# Patient Record
Sex: FEMALE | Race: BLACK OR AFRICAN AMERICAN | Employment: OTHER | URBAN - METROPOLITAN AREA
[De-identification: names, ages, dates, MRNs, and addresses within clinical notes are randomized per-mention and may not be internally consistent; named-entity substitution may affect disease eponyms.]

---

## 2017-02-22 ENCOUNTER — APPOINTMENT (OUTPATIENT)
Dept: GENERAL RADIOLOGY | Age: 67
End: 2017-02-22
Attending: NURSE PRACTITIONER
Payer: MEDICARE

## 2017-02-22 ENCOUNTER — HOSPITAL ENCOUNTER (EMERGENCY)
Age: 67
Discharge: HOME | End: 2017-02-23
Attending: EMERGENCY MEDICINE
Payer: MEDICARE

## 2017-02-22 ENCOUNTER — HOSPITAL ENCOUNTER (OUTPATIENT)
Dept: PHYSICAL THERAPY | Age: 67
Discharge: HOME | End: 2017-02-22
Payer: MEDICARE

## 2017-02-22 VITALS
TEMPERATURE: 98.7 F | BODY MASS INDEX: 40.12 KG/M2 | HEART RATE: 72 BPM | DIASTOLIC BLOOD PRESSURE: 75 MMHG | SYSTOLIC BLOOD PRESSURE: 152 MMHG | OXYGEN SATURATION: 100 % | HEIGHT: 64 IN | RESPIRATION RATE: 24 BRPM | WEIGHT: 235 LBS

## 2017-02-22 DIAGNOSIS — R03.0 ELEVATED BLOOD PRESSURE READING: ICD-10-CM

## 2017-02-22 DIAGNOSIS — R00.2 PALPITATIONS: Primary | ICD-10-CM

## 2017-02-22 DIAGNOSIS — F41.9 ANXIETY DISORDER, UNSPECIFIED: ICD-10-CM

## 2017-02-22 LAB — TROPONIN I BLD-MCNC: <0.04 NG/ML (ref 0–0.08)

## 2017-02-22 PROCEDURE — 97110 THERAPEUTIC EXERCISES: CPT

## 2017-02-22 PROCEDURE — 97530 THERAPEUTIC ACTIVITIES: CPT

## 2017-02-22 PROCEDURE — 93005 ELECTROCARDIOGRAM TRACING: CPT

## 2017-02-22 PROCEDURE — 97162 PT EVAL MOD COMPLEX 30 MIN: CPT

## 2017-02-22 PROCEDURE — 99285 EMERGENCY DEPT VISIT HI MDM: CPT

## 2017-02-22 PROCEDURE — 84484 ASSAY OF TROPONIN QUANT: CPT

## 2017-02-22 PROCEDURE — 71010 XR CHEST PORT: CPT

## 2017-02-22 PROCEDURE — G8981 BODY POS CURRENT STATUS: HCPCS

## 2017-02-22 PROCEDURE — G8982 BODY POS GOAL STATUS: HCPCS

## 2017-02-22 NOTE — PROGRESS NOTES
[address]  [facility] PHYSICAL THERAPY  [address] [facility] [address], [address] - Phone: [phone]  Fax: [phone] / [address] [facility]  Patient Name: Luis Ramirez : 1950   Medical   Diagnosis: Low back pain [M54.5] Treatment Diagnosis: LBP, SIJ dysfunction   Onset Date: ~1 year ago     Referral Source: Ivar Fothergill, DO Start of Care [facility]): 2017   Prior Hospitalization: See medical history Provider #: [id number]   Prior Level of Function: Working part time as  for school program   Comorbidities: previous noncompliance with therapy; chronicity of condition; borderline DM, Hx breast CA with double mastectomy, allergies, anxiety, borderline depression, arthritis, back pain, obese, HX previous surgeries, bladder problems (bladder mesh), cataracts   Medications: Verified on Patient Summary List   The Plan of Care and following information is based on the information from the initial evaluation.   ===========================================================================================  Assessment / key information:  Luis Ramirez is a 73 y.o.  female with Dx: Low back pain [M54.5], signs and symptoms consistent with LBP and SIJ dysfunction. Patient reports LBP began ~1 year ago, previously treated with PT in Aug 2016, however patient was discharged due to noncompliance. Patient reports 3 weeks ago she went bowling and pain has gotten progressively worse since. Patient reports she has radicular pain down L>R LE's, also c/o L>R knee pain. Objective: Patient demonstrates impaired gait and posture with L rib hump and elevated R iliac crest, and slumped sitting posture. Patient with TTP L>R PSIS, as well as increased muscle tone B piriformis. Patient also with impaired lumbar ROM and LE strength, reports pain with all ROM.  Patient with fair+ supine bridge, - SLR and 90/90 HS tests, + Ely test bilaterally. Patient reports centralization of symptoms with extension bias, also + supine to sit test for R anterior SIJ. L/S ROM Range Effect Strength (MMT) Right Left   Flex 75%   Psoas (L2,3) 4+ 4+   Ext 75%   Quads (L3) 4+ 4+   R-lat flex 75%   Ant tib(L4) 4+ 4+   L-lat flex 75%   Hip Ext (S1, S2) 3- 3-   R-rot 50%   Glut Med(L5) 4 4   L-rot 50%   Hamstrings(S1,S2) 4+ 4+      FOTO score 49 points indicating 51% limitation in functional ability. Patient would benefit from skilled PT to address the below listed impairments. Thank you for your referral.  When patient called back for evaluation, she stated \"I should have continued coming last time I was here but I didn't feel like it. \"  ===========================================================================================  Eval Complexity: History: HIGH Complexity :3+ comorbidities / personal factors will impact the outcome/ POC Exam:HIGH Complexity : 4+ Standardized tests and measures addressing body structure, function, activity limitation and / or participation in recreation  Presentation: MEDIUM Complexity : Evolving with changing characteristics  Clinical Decision Making:MEDIUM Complexity : FOTO score of 26-74Overall Complexity:MEDIUM  Problem List: pain affecting function, decrease ROM, decrease strength, edema affecting function, impaired gait/ balance, decrease ADL/ functional abilitiies, decrease activity tolerance, decrease flexibility/ joint mobility and decrease transfer abilities   Treatment Plan may include any combination of the following: Therapeutic exercise, Therapeutic activities, Neuromuscular re-education, Physical agent/modality, Gait/balance training, Manual therapy, Aquatic therapy, Patient education, Self Care training, Functional mobility training, Home safety training and Stair training  Patient / Family readiness to learn indicated by: asking questions, trying to perform skills and interest  Persons(s) to be included in education: patient (P)  Barriers to Learning/Limitations: None  Measures taken: na   Patient Goal (s): \"relief\"   Patient self reported health status: good  Rehabilitation Potential: fair   Short Term Goals: To be accomplished in  2-3  weeks:  1. Patient will demonstrate compliance with HEP for symptom management at home. 2. Patient will demonstrate symmetrical innominates with supine to sit test to decrease stress on spine. 3. Patient will I centralize symptoms to level of buttocks to improve ADL tolerance.  Long Term Goals: To be accomplished in  4-6  weeks:  1. Patient will be independent with HEP to self-manage and prevent symptoms upon DC. 2.  Patient will improve FOTO score to 59 points to indicate improved functional status. 3.  Patient will I centralize symptoms to level of spine to improve ADL tolerance. 4.  Patient will improve B hip ext strength to 4/5 to improve dynamic pelvic stability. Frequency / Duration:   Patient to be seen  2-3  times per week for 4-6  weeks:  Patient / Caregiver education and instruction: self care, activity modification and exercises  G-Codes (GP): [id number] Current  CK= 40-59%   Goal  CK= 40-59%. The severity rating is based on the FOTO Score    Therapist Signature: April JUANA Sheppard Date: 3/82/2747   Certification Period: 2/22/17-5/21/17 Time: 9:15 AM   ===========================================================================================  I certify that the above Physical Therapy Services are being furnished while the patient is under my care. I agree with the treatment plan and certify that this therapy is necessary. Physician Signature:        Date:       Time:     Please sign and return to [employer] or you may fax the signed copy to [phone]. Thank you.

## 2017-02-22 NOTE — PROGRESS NOTES
PHYSICAL THERAPY - DAILY TREATMENT NOTE    Patient Name: Erum Prieto        Date: 2017  : 1950   YES Patient  Verified  Visit #:     Insurance: Payor: [place] MED[place] / Plan: [place] MEDICARE PART A & B / Product Type: Medicare /      In time: 9:13 (late) Out time: 9:55   Total Treatment Time: 43     Medicare Time Tracking (below)   Total Timed Codes (min):  42 1:1 Treatment Time:  42     TREATMENT AREA =  L/S  SUBJECTIVE    Pain Level (on 0 to 10 scale):  7  / 10   Medication Changes/New allergies or changes in medical history, any new surgeries or procedures? YES    If yes, update Summary List   Subjective Functional Status/Changes:  []  No changes reported   When patient called from waiting room, patient states \"I should have continued coming last time I was here but I didn't feel like it. \"    History of Condition: Patient reports LBP and leg pain began ~1 year ago. Patient reports she has had recent MRI, \"something in my back, a bone or something, it wasn't major\". Patient denies injections, patient seen for PT beginning in Aug 2016, however noncompliant DC. Patient reports she went bowling ~3 weeks ago and has had progressively worsening pain. Patient reports she had gone to [place] prior to [place] and was referred to pain specialist. Patient reports at that time she was doing TM and bike at gym. Patient reports she has also been traveling a lot. Patient reports she has no numbness and tingling, however also c/o knee pain L>R.     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; enjoys walking    Pt Goals: \"relief\"     FOTO: 49     LOW BACK EVALUATION    Objective:      Gait:increased trunk flexion, slow vish    Posture: L rib hump, R iliac crest higher; slumped sitting posture    Palpation/Sensation: sensation to light touch intact  L>R PSIS, increased muscle tone B piriformis    (N - normal; R - reduced; MR - markedly reduced)       L/S ROM      Range   Effect  Strength (MMT)          Right        Left    Flex 75%  Psoas (L2,3) 4+ 4+   Ext 75%  Quads (L3) 4+ 4+   R-lat flex 75%  Ant tib(L4) 4+ 4+   L-lat flex 75%  Hip Ext (S1, S2) 3- 3-   R-rot 50%  Glut Med(L5) 4 4   L-rot 50%  Hamstrings(S1,S2) 4+ 4+      Hip IR/ER     Pain will all ROM, none worse than others    Strength (MMT)       Right     Left          Core: Sup Bridge Fair+ Fair+   Core: Side Bridge     Core: Prone plank       Special Tests                       Right     Left          Flexibility         Right              Left    Slump   90/90 HS -11 -19   SLR - - Ely + +      David     Sl screen 3/5=70% LR   Debora     Gaenslen test   Hip IR (prone)     Khari/YAIR sign   Hip ER (prone)     Thigh thrust        Distraction        Lateral Compression        Sacral Provocation          Effect of:  DKC    Supine Bridge    SL Supine Bridge    Prone on Elbows    RFIS    REIL centralized         SI Symmetry:    Standing        Supine            Prone                Dynamic      +/- R/L  ASIS    Fwd Flex    IC    Stork    PSIS    Seated FF      Sup to Sit: + R anterior SIJ    Modalities Rationale:        min [] Estim, type/location:                                      []  att     []  unatt     []  w/US     []  w/ice    []  w/heat    min []  Mechanical Traction: type/lbs                   []  pro   []  sup   []  int   []  cont    []  before manual    []  after manual    min []  Ultrasound, settings/location:      min []  Iontophoresis w/ dexamethasone, location:                                               []  take home patch       []  in clinic    min []  Ice     []  Heat    location/position:     min []  Vasopneumatic Device, press/temp:     min []  Other:    [] Skin assessment post-treatment (if applicable):    []  intact    []  redness- no adverse reaction     []redness  adverse reaction:      15 min Therapeutic Exercise:  [x]  See flow sheet   Rationale:      increase ROM, increase strength and improve coordination to improve the patients ability to perform ADL's and amb with decreased pain and improved ease     15 min Therapeutic Activity: FOTO   Rationale:   Determine functional limitations     min Patient Education:  []  Review HEP   []  Progressed/Changed HEP based on:   HEP initiated      Other Objective/Functional Measures:    See above information     Post Treatment Pain Level (on 0 to 10) scale:   6  / 10     ASSESSMENT    Assessment/Changes in Function:     Justification for Eval Code Complexity:  Patient History (low 0, mod 1-2, high 3-4): previous noncompliance with therapy; chronicity of condition; borderline DM, Hx breast CA with double mastectomy, allergies, anxiety, borderline depression, arthritis, back pain, obese, HX previous surgeries, bladder problems (bladder mesh), cataracts  Examination (low 1-2, mod 3+, high 4+):   Clinical Presentation (low stable or uncomplicated, mod evolving or changing, high unstable or unpredictable):   Clinical Decision Making (low , mod 26-74, high 1-25): FOTO     See PoC     []  See Progress Note/Recertification   Patient will continue to benefit from skilled PT services to modify and progress therapeutic interventions, address functional mobility deficits, address ROM deficits, address strength deficits, analyze and address soft tissue restrictions, analyze and cue movement patterns, analyze and modify body mechanics/ergonomics and assess and modify postural abnormalities to attain remaining goals.    Progress toward goals / Updated goals:    Goals established, see PoC     PLAN    [x]  Upgrade activities as tolerated YES Continue plan of care   []  Discharge due to :    [x]  Other: Initiate PoC     Therapist: King Crigler, PT    Date: 2/22/2017 Time: 9:15 AM

## 2017-02-23 ENCOUNTER — APPOINTMENT (OUTPATIENT)
Dept: CT IMAGING | Age: 67
End: 2017-02-23
Attending: NURSE PRACTITIONER
Payer: MEDICARE

## 2017-02-23 LAB
ATRIAL RATE: 72 BPM
CALCULATED P AXIS, ECG09: 70 DEGREES
CALCULATED R AXIS, ECG10: 29 DEGREES
CALCULATED T AXIS, ECG11: 33 DEGREES
DIAGNOSIS, 93000: NORMAL
P-R INTERVAL, ECG05: 168 MS
Q-T INTERVAL, ECG07: 404 MS
QRS DURATION, ECG06: 86 MS
QTC CALCULATION (BEZET), ECG08: 442 MS
VENTRICULAR RATE, ECG03: 72 BPM

## 2017-02-23 PROCEDURE — 74011250637 HC RX REV CODE- 250/637: Performed by: NURSE PRACTITIONER

## 2017-02-23 RX ORDER — LORAZEPAM 1 MG/1
2 TABLET ORAL
Status: COMPLETED | OUTPATIENT
Start: 2017-02-23 | End: 2017-02-23

## 2017-02-23 RX ADMIN — LORAZEPAM 2 MG: 1 TABLET ORAL at 00:25

## 2017-02-23 NOTE — DISCHARGE INSTRUCTIONS
Palpitations: Care Instructions  Your Care Instructions    Heart palpitations are the uncomfortable sensation that your heart is beating fast or irregularly. You might feel pounding or fluttering in your chest. It might feel like your heart is skipping a beat. Although palpitations may be caused by a heart problem, they also occur because of stress, fatigue, or use of alcohol, caffeine, or nicotine. Many medicines, including diet pills, antihistamines, decongestants, and some herbal products, can cause heart palpitations. Nearly everyone has palpitations from time to time. Depending on your symptoms, your doctor may need to do more tests to try to find the cause of your palpitations. Follow-up care is a key part of your treatment and safety. Be sure to make and go to all appointments, and call your doctor if you are having problems. It's also a good idea to know your test results and keep a list of the medicines you take. How can you care for yourself at home? · Avoid caffeine, nicotine, and excess alcohol. · Do not take illegal drugs, such as methamphetamines and cocaine. · Do not take weight loss or diet medicines unless you talk with your doctor first.  · Get plenty of sleep. · Do not overeat. · If you have palpitations again, take deep breaths and try to relax. This may slow a racing heart. · If you start to feel lightheaded, lie down to avoid injuries that might result if you pass out and fall down. · Keep a record of your palpitations and bring it to your next doctor's appointment. Write down:  ¨ The date and time. ¨ Your pulse. (If your heart is beating fast, it may be hard to count your pulse.)  ¨ What you were doing when the palpitations started. ¨ How long the palpitations lasted. ¨ Any other symptoms. · If an activity causes palpitations, slow down or stop. Talk to your doctor before you do that activity again. · Take your medicines exactly as prescribed.  Call your doctor if you think you are having a problem with your medicine. When should you call for help? Call 911 anytime you think you may need emergency care. For example, call if:  · You passed out (lost consciousness). · You have symptoms of a heart attack. These may include:  ¨ Chest pain or pressure, or a strange feeling in the chest.  ¨ Sweating. ¨ Shortness of breath. ¨ Pain, pressure, or a strange feeling in the back, neck, jaw, or upper belly or in one or both shoulders or arms. ¨ Lightheadedness or sudden weakness. ¨ A fast or irregular heartbeat. After you call 911, the  may tell you to chew 1 adult-strength or 2 to 4 low-dose aspirin. Wait for an ambulance. Do not try to drive yourself. · You have symptoms of a stroke. These may include:  ¨ Sudden numbness, tingling, weakness, or loss of movement in your face, arm, or leg, especially on only one side of your body. ¨ Sudden vision changes. ¨ Sudden trouble speaking. ¨ Sudden confusion or trouble understanding simple statements. ¨ Sudden problems with walking or balance. ¨ A sudden, severe headache that is different from past headaches. Call your doctor now or seek immediate medical care if:  · You have heart palpitations and:  ¨ Are dizzy or lightheaded, or you feel like you may faint. ¨ Have new or increased shortness of breath. Watch closely for changes in your health, and be sure to contact your doctor if:  · You continue to have heart palpitations. Where can you learn more? Go to http://shauna-toshia.info/. Enter R508 in the search box to learn more about \"Palpitations: Care Instructions. \"  Current as of: January 27, 2016  Content Version: 11.1  © 8374-9563 [employer]. Care instructions adapted under license by [employer] (which disclaims liability or warranty for this information).  If you have questions about a medical condition or this instruction, always ask your healthcare professional. Landy Ni[facility] disclaims any warranty or liability for your use of this information. Anxiety Disorder: Care Instructions  Your Care Instructions  Anxiety is a normal reaction to stress. Difficult situations can cause you to have symptoms such as sweaty palms and a nervous feeling. In an anxiety disorder, the symptoms are far more severe. Constant worry, muscle tension, trouble sleeping, nausea and diarrhea, and other symptoms can make normal daily activities difficult or impossible. These symptoms may occur for no reason, and they can affect your work, school, or social life. Medicines, counseling, and self-care can all help. Follow-up care is a key part of your treatment and safety. Be sure to make and go to all appointments, and call your doctor if you are having problems. It's also a good idea to know your test results and keep a list of the medicines you take. How can you care for yourself at home? · Take medicines exactly as directed. Call your doctor if you think you are having a problem with your medicine. · Go to your counseling sessions and follow-up appointments. · Recognize and accept your anxiety. Then, when you are in a situation that makes you anxious, say to yourself, \"This is not an emergency. I feel uncomfortable, but I am not in danger. I can keep going even if I feel anxious. \"  · Be kind to your body:  ¨ Relieve tension with exercise or a massage. ¨ Get enough rest.  ¨ Avoid alcohol, caffeine, nicotine, and illegal drugs. They can increase your anxiety level and cause sleep problems. ¨ Learn and do relaxation techniques. See below for more about these techniques. · Engage your mind. Get out and do something you enjoy. Go to a funny movie, or take a walk or hike. Plan your day. Having too much or too little to do can make you anxious. · Keep a record of your symptoms. Discuss your fears with a good friend or family member, or join a support group for people with similar problems. Talking to others sometimes relieves stress. · Get involved in social groups, or volunteer to help others. Being alone sometimes makes things seem worse than they are. · Get at least 30 minutes of exercise on most days of the week to relieve stress. Walking is a good choice. You also may want to do other activities, such as running, swimming, cycling, or playing tennis or team sports. Relaxation techniques  Do relaxation exercises 10 to 20 minutes a day. You can play soothing, relaxing music while you do them, if you wish. · Tell others in your house that you are going to do your relaxation exercises. Ask them not to disturb you. · Find a comfortable place, away from all distractions and noise. · Lie down on your back, or sit with your back straight. · Focus on your breathing. Make it slow and steady. · Breathe in through your nose. Breathe out through either your nose or mouth. · Breathe deeply, filling up the area between your navel and your rib cage. Breathe so that your belly goes up and down. · Do not hold your breath. · Breathe like this for 5 to 10 minutes. Notice the feeling of calmness throughout your whole body. As you continue to breathe slowly and deeply, relax by doing the following for another 5 to 10 minutes:  · Tighten and relax each muscle group in your body. You can begin at your toes and work your way up to your head. · Imagine your muscle groups relaxing and becoming heavy. · Empty your mind of all thoughts. · Let yourself relax more and more deeply. · Become aware of the state of calmness that surrounds you. · When your relaxation time is over, you can bring yourself back to alertness by moving your fingers and toes and then your hands and feet and then stretching and moving your entire body. Sometimes people fall asleep during relaxation, but they usually wake up shortly afterward.   · Always give yourself time to return to full alertness before you drive a car or do anything that might cause an accident if you are not fully alert. Never play a relaxation tape while you drive a car. When should you call for help? Call 911 anytime you think you may need emergency care. For example, call if:  · You feel you cannot stop from hurting yourself or someone else. Keep the numbers for these national suicide hotlines: 7-938-584-TALK (9-763.207.5912) and 2-462-TCZIQOB (5-529.575.9066). If you or someone you know talks about suicide or feeling hopeless, get help right away. Watch closely for changes in your health, and be sure to contact your doctor if:  · You have anxiety or fear that affects your life. · You have symptoms of anxiety that are new or different from those you had before. Where can you learn more? Go to http://shaunaEdgeSpringtoshia.info/. Enter P754 in the search box to learn more about \"Anxiety Disorder: Care Instructions. \"  Current as of: July 26, 2016  Content Version: 11.1  © 8700-4216 [employer]. Care instructions adapted under license by [employer] (which disclaims liability or warranty for this information). If you have questions about a medical condition or this instruction, always ask your healthcare professional. [address] any warranty or liability for your use of this information. [employer] Activation    Thank you for requesting access to [employer]. Please follow the instructions below to securely access and download your online medical record. [employer] allows you to send messages to your doctor, view your test results, renew your prescriptions, schedule appointments, and more. How Do I Sign Up? 1. In your internet browser, go to [email or web address]  2. Click on the First Time User? Click Here link in the Sign In box. You will be redirect to the New Member Sign Up page. 3. Enter your [employer] Access Code exactly as it appears below.  You will not need to use this code after youve completed the sign-up process. If you do not sign up before the expiration date, you must request a new code. [employer] Access Code: [email or web address]  Expires: 2017  3:52 PM (This is the date your [employer] access code will )    4. Enter the last four digits of your Social Security Number (xxxx) and Date of Birth (mm/dd/yyyy) as indicated and click Submit. You will be taken to the next sign-up page. 5. Create a [employer] ID. This will be your [employer] login ID and cannot be changed, so think of one that is secure and easy to remember. 6. Create a [employer] password. You can change your password at any time. 7. Enter your Password Reset Question and Answer. This can be used at a later time if you forget your password. 8. Enter your e-mail address. You will receive e-mail notification when new information is available in [address]. 9. Click Sign Up. You can now view and download portions of your medical record. 10. Click the Download Summary menu link to download a portable copy of your medical information. Additional Information    If you have questions, please visit the Frequently Asked Questions section of the [employer] website at [email or web address] Remember, [employer] is NOT to be used for urgent needs. For medical emergencies, dial 911. Follow up with Dr Liliana Bragg, by calling in the morning for an appointment.

## 2017-02-23 NOTE — ED PROVIDER NOTES
HPI Comments: Fabio King is a 73year old female who arrives to the ED via [facility]. Pt states she has palpitations that started approx 15 minutes prior to calling EMS. Admits to Hx of panic attacks, but states she has never had one this bad. Patient is a 73 y.o. female presenting with panic and palpitations. The history is provided by the patient and the EMS personnel. History limited by: no communication barrier. Panic Attack    This is a new problem. The current episode started 3 to 5 hours ago. The problem has not changed since onset. The problem occurs constantly. Associated with: Pt makes a loose association to previous anxiety attacks. The patient is experiencing no pain. Quality: palpitation. The pain does not radiate. Associated symptoms include palpitations. She has tried nothing for the symptoms. Risk factors include hormone replacement therapy (Hx of Panic Attacks). Palpitations           Past Medical History:   Diagnosis Date    Breast cancer ([facility] [address]     Kidney stone     Urinary tract infection, site not specified        Past Surgical History:   Procedure Laterality Date    HX MASTECTOMY      double mastectomy         Family History:   Problem Relation Age of Onset    Cancer Mother      breast       Social History     Social History    Marital status:      Spouse name: N/A    Number of children: N/A    Years of education: N/A     Occupational History    Not on file. Social History Main Topics    Smoking status: Never Smoker    Smokeless tobacco: Never Used    Alcohol use Yes      Comment: occassionally    Drug use: Not on file    Sexual activity: Not on file     Other Topics Concern    Not on file     Social History Narrative         ALLERGIES: Ciprofloxacin; Codeine; Pcn [penicillins]; and Sulfa (sulfonamide antibiotics)    Review of Systems   Constitutional: Negative. HENT: Negative. Eyes: Negative. Respiratory: Negative.     Cardiovascular: Positive for palpitations. Gastrointestinal: Negative. Endocrine: Negative. Genitourinary: Negative. Musculoskeletal: Negative. Skin: Negative. Allergic/Immunologic: Negative. Neurological: Negative. Hematological: Negative. Psychiatric/Behavioral: Negative. Vitals:    02/22/17 2256   BP: 152/75   Pulse: 72   Resp: 24   Temp: 98.7 °F (37.1 °C)   SpO2: 100%   Weight: 106.6 kg (235 lb)   Height: 5' 4\" (1.626 m)            Physical Exam   Constitutional: She is oriented to person, place, and time. She appears well-developed and well-nourished. No distress. HENT:   Head: Normocephalic and atraumatic. Eyes: EOM are normal. Pupils are equal, round, and reactive to light. Neck: Normal range of motion. Neck supple. Cardiovascular: Normal rate, regular rhythm and normal heart sounds. Pulmonary/Chest: Effort normal and breath sounds normal. No respiratory distress. She has no wheezes. She has no rales. Palpation to the anterior chest wall does not create pain./     Abdominal: Soft. Bowel sounds are normal. There is no tenderness. Genitourinary:   Genitourinary Comments: NE   Musculoskeletal: Normal range of motion. She exhibits no edema, tenderness or deformity. Neurological: She is alert and oriented to person, place, and time. No cranial nerve deficit. She exhibits normal muscle tone. Coordination normal.   Skin: Skin is warm and dry. Psychiatric: She has a normal mood and affect. Nursing note and vitals reviewed. MDM  Number of Diagnoses or Management Options  Anxiety disorder, unspecified:   Elevated blood pressure reading:   Palpitations:   Diagnosis management comments: PROGRESS NOTE:  Pt states she feels much better than when she came in, but request Ativan to help her settle down. Nicholas Chan NP   12:24 AM    PROGRESS NOTE:  The CXR and ekg were reveiwed, as well as the EMS EKG.          Amount and/or Complexity of Data Reviewed  Tests in the radiology section of CPT®: ordered and reviewed    Risk of Complications, Morbidity, and/or Mortality  Presenting problems: low  Diagnostic procedures: low  Management options: low    Patient Progress  Patient progress: stable    ED Course       Procedures    PROGRESS NOTE:  One or more blood pressure readings were noted elevated during the Pt's presentation in the emergency department this date. This abnormal reading has been cited in the Pt's diagnosis, and they have been encouraged to follow up with their primary care physician, or referred to a consultant for further evaluation and treatment. Salma Ventura NP  12:38 AM    Diagnosis:   1. Palpitations    2. Anxiety disorder, unspecified    3. Elevated blood pressure reading          Disposition:   Discharged to Home. Follow-up Information     Follow up With Details Comments [place], [place] Call for an appointment. [address] [place] [address]  [phone]            Patient's Medications   Start Taking    No medications on file   Continue Taking    ERGOCALCIFEROL (VITAMIN D2) 50,000 UNIT CAPSULE    Take 50,000 Units by mouth. HYDROCODONE-ACETAMINOPHEN (NORCO) 5-325 MG PER TABLET    Take 1 Tab by mouth every twelve (12) hours. MULTIVITAMIN (ONE A DAY) TABLET    Take 1 Tab by mouth daily. ONDANSETRON HCL (ZOFRAN) 4 MG TABLET    Take 1 Tab by mouth every six (6) hours as needed for Nausea for 10 doses.    These Medications have changed    No medications on file   Stop Taking    No medications on file

## 2017-02-23 NOTE — ED NOTES
Pt reports sx have improved, in nad accompanied by daughter and son in law.  Pt ambulatory to ED lobby

## 2017-03-01 ENCOUNTER — APPOINTMENT (OUTPATIENT)
Dept: PHYSICAL THERAPY | Age: 67
End: 2017-03-01

## 2017-03-10 ENCOUNTER — APPOINTMENT (OUTPATIENT)
Dept: PHYSICAL THERAPY | Age: 67
End: 2017-03-10

## 2017-03-15 ENCOUNTER — APPOINTMENT (OUTPATIENT)
Dept: PHYSICAL THERAPY | Age: 67
End: 2017-03-15

## 2017-03-22 NOTE — PROGRESS NOTES
[address] [facility] PHYSICAL THERAPY  [address] [address] - Phone: [phone]  Fax: [phone]                             DISCHARGE SUMMARY FOR:        [x]  PHYSICAL THERAPY       []  OCCUPATIONAL THERAPY       Dear / EVANP/ PA:  Shima Briseno DO, under your direction, we have been providing physical therapy for your patient Sandy Alex, for a diagnosis of Low back pain [M54.5]. The patient was scheduled for 8 visits. They attended 1 of them and was last seen on 2/22/17 for initial evaluation. The patient then missed all remaining appointments, NS to 4 and cancelling others due to family emergency and scheduling conflict. Due to the inability to further their care from non-attendance, we are discharging the patient from physical therapy at this time. We appreciate the kind referral and would willingly work with this patient again, should they be able to arrange regular attendance. Your patient's health is our primary concern. Should you have any further questions or concerns, please feel free to contact me at your convenience. Sincerely,     Barbara Moreno, PT                   3/22/2017    If you have any questions/comments please contact us directly at [phone]. Thank you for allowing us to assist in the care of your patient.     Therapist Signature: Barbara Moreno, PT Date: 8/94/1841   Certification Period: 2/22/17/5/21/17 Time: 9:54 AM   NOTE TO PHYSICIAN:  PLEASE COMPLETE THE ORDERS BELOW AND FAX TO   [facility]: [phone]  If you are unable to process this request in 24 hours please contact our office: [phone]    ___ I have read the above report and request that my patient continue as recommended.   ___ I have read the above report and request that my patient continue therapy with the following changes/special instructions:_________________________________________________________   ___ I have read the above report and request that my patient be discharged from therapy.      Physician Signature:        Date:       Time:

## 2017-03-24 ENCOUNTER — APPOINTMENT (OUTPATIENT)
Dept: PHYSICAL THERAPY | Age: 67
End: 2017-03-24

## 2017-03-29 ENCOUNTER — APPOINTMENT (OUTPATIENT)
Dept: PHYSICAL THERAPY | Age: 67
End: 2017-03-29

## 2017-03-31 ENCOUNTER — APPOINTMENT (OUTPATIENT)
Dept: PHYSICAL THERAPY | Age: 67
End: 2017-03-31

## 2017-07-11 ENCOUNTER — HOSPITAL ENCOUNTER (OUTPATIENT)
Dept: PHYSICAL THERAPY | Age: 67
Discharge: HOME | End: 2017-07-11
Payer: MEDICARE

## 2017-07-11 PROCEDURE — 97110 THERAPEUTIC EXERCISES: CPT

## 2017-07-11 PROCEDURE — G8979 MOBILITY GOAL STATUS: HCPCS

## 2017-07-11 PROCEDURE — 97530 THERAPEUTIC ACTIVITIES: CPT

## 2017-07-11 PROCEDURE — 97162 PT EVAL MOD COMPLEX 30 MIN: CPT

## 2017-07-11 PROCEDURE — G8978 MOBILITY CURRENT STATUS: HCPCS
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
## 2017-07-13 ENCOUNTER — HOSPITAL ENCOUNTER (OUTPATIENT)
Dept: PHYSICAL THERAPY | Age: 67
Discharge: HOME | End: 2017-07-13
Payer: MEDICARE

## 2017-07-13 PROCEDURE — 97110 THERAPEUTIC EXERCISES: CPT

## 2017-07-13 NOTE — PROGRESS NOTES
PHYSICAL THERAPY - DAILY TREATMENT NOTE    Patient Name: Gricelda Lipoma        Date: 2017  : 1950   YES Patient  Verified  Visit #:   2     Insurance: Payor: Vaughn Rooney / Plan: [place] MEDICARE PART A & B / Product Type: Medicare /      In time: 7:35 Out time: 8:15   Total Treatment Time: 40     Medicare Time Tracking (below)   Total Timed Codes (min):  40 1:1 Treatment Time:  40     TREATMENT AREA =  L/S and R SIJ    SUBJECTIVE    Pain Level (on 0 to 10 scale):  2  / 10   Medication Changes/New allergies or changes in medical history, any new surgeries or procedures? NO    If yes, update Summary List   Subjective Functional Status/Changes:  []  No changes reported     \"I think the exercises are helping, but the pain just kind of comes and goes. \"          OBJECTIVE    40 min Therapeutic Exercise:  [x]  See flow sheet   Rationale:      increase ROM and increase strength to improve the patients ability to perform ADL's, gait, and functional mobility with decreased pain. min Patient Education:  YES  Revised HEP   []  Progressed/Changed HEP based on:   Cont HEP     Other Objective/Functional Measures:    ASIS/PSIS level in standing position. L lateral shift noted in standing. Post Treatment Pain Level (on 0 to 10) scale:   0  / 10     ASSESSMENT    Assessment/Changes in Function:     Tolerated exercises without c/o's.     []  See Progress Note/Recertification   Patient will continue to benefit from skilled PT services to modify and progress therapeutic interventions, address functional mobility deficits, address ROM deficits, address strength deficits, analyze and address soft tissue restrictions, analyze and cue movement patterns, analyze and modify body mechanics/ergonomics, assess and modify postural abnormalities and instruct in home and community integration to attain remaining goals. Progress toward goals / Updated goals: · Short Term Goals:  To be accomplished in  2-3 weeks: 1. Patient will demonstrate compliance with HEP for symptom management at home. Reports compliance with HEP. 2. Patient will demonstrate a negative 90-90 test to decrease stress on spine. Cont HS stretch  3. Patient will increase R hip extension strength to 4- to increase ease and symmetry with amb. Cont LE strengthening exercises.      PLAN    [x]  Upgrade activities as tolerated YES Continue plan of care   []  Discharge due to :    []  Other:      Therapist: Myah Escamilla PT    Date: 7/13/2017 Time: 7:39 AM     Future Appointments  Date Time Provider Nito Stiles   7/19/2017 7:30 AM Esvin Davies PT [facility]   7/20/2017 9:00 AM Myah Escamilla PT [facility]

## 2017-07-19 ENCOUNTER — HOSPITAL ENCOUNTER (OUTPATIENT)
Dept: PHYSICAL THERAPY | Age: 67
Discharge: HOME | End: 2017-07-19
Payer: MEDICARE

## 2017-07-19 PROCEDURE — 97110 THERAPEUTIC EXERCISES: CPT

## 2017-07-19 NOTE — PROGRESS NOTES
PHYSICAL THERAPY - DAILY TREATMENT NOTE    Patient Name: Criselda Grey        Date: 2017  : 1950   YES Patient  Verified  Visit #:   3     Insurance: Payor: Otilia Smith / Plan: [place] MEDICARE PART A & B / Product Type: Medicare /      In time: 7:35 Out time: 8:00   Total Treatment Time: 25     Medicare Time Tracking (below)   Total Timed Codes (min):  25 1:1 Treatment Time:  25     TREATMENT AREA =  Lower back pain [M54.5]  SUBJECTIVE    Pain Level (on 0 to 10 scale):  0  / 10   Medication Changes/New allergies or changes in medical history, any new surgeries or procedures? NO    If yes, update Summary List   Subjective Functional Status/Changes:  []  No changes reported     Pt without complaints. OBJECTIVE    25 min Therapeutic Exercise:  [x]  See flow sheet   Rationale:      increase ROM, increase strength and increase proprioception to improve the patients ability to perform ADLs/IADLs, functional mobility and gait safely and independently without increased pain/symptoms     During TE min Patient Education:  YES  Reviewed HEP   []  Progressed/Changed HEP based on:   Cont HEP     Other Objective/Functional Measures:    Increased reps (refer to flowsheet for details)     Post Treatment Pain Level (on 0 to 10) scale:   0  / 10     ASSESSMENT    Assessment/Changes in Function:     Tolerated exercises without complaints     []  See Progress Note/Recertification   Patient will continue to benefit from skilled PT services to modify and progress therapeutic interventions, address functional mobility deficits, address ROM deficits, address strength deficits, analyze and address soft tissue restrictions, analyze and cue movement patterns, analyze and modify body mechanics/ergonomics, assess and modify postural abnormalities and instruct in home and community integration to attain remaining goals. Progress toward goals / Updated goals:    Progressing toward goals:  1.  Patient will demonstrate compliance with HEP for symptom management at home. Reports compliance with HEP. 2. Patient will demonstrate a negative 90-90 test to decrease stress on spine. Cont HS stretch  3. Patient will increase R hip extension strength to 4- to increase ease and symmetry with amb. Cont LE strengthening exercises.        PLAN    [x]  Upgrade activities as tolerated YES Continue plan of care   []  Discharge due to :    []  Other:      Therapist: Olivier Villanueva PT    Date: 7/19/2017 Time: 7:36 AM     Future Appointments  Date Time Provider Nito Stiles   7/20/2017 9:00 AM Khang Petty PT [facility]

## 2017-07-20 ENCOUNTER — APPOINTMENT (OUTPATIENT)
Dept: PHYSICAL THERAPY | Age: 67
End: 2017-07-20
Payer: MEDICARE

## 2017-07-25 ENCOUNTER — HOSPITAL ENCOUNTER (OUTPATIENT)
Dept: PHYSICAL THERAPY | Age: 67
Discharge: HOME | End: 2017-07-25
Payer: MEDICARE

## 2017-07-25 PROCEDURE — 97110 THERAPEUTIC EXERCISES: CPT

## 2017-07-25 NOTE — PROGRESS NOTES
PHYSICAL THERAPY - DAILY TREATMENT NOTE    Patient Name: Jay Branch        Date: 2017  : 1950   YES Patient  Verified  Visit #:   4     Insurance: Payor: Anuel Felixd / Plan: [place] B / Product Type: Medicare /      In time: 8:35 Out time: 9:20   Total Treatment Time: 45     Medicare Time Tracking (below)   Total Timed Codes (min):  45 1:1 Treatment Time:  15     TREATMENT AREA =  Lower back pain [M54.5]  SUBJECTIVE    Pain Level (on 0 to 10 scale): 6  / 10 L LE   Medication Changes/New allergies or changes in medical history, any new surgeries or procedures? NO    If yes, update Summary List   Subjective Functional Status/Changes:  []  No changes reported     Pt reports 6/10 pain throughout L LE at start of treatment session. Pt reports that she did a lot of sitting over the weekend, and that she thinks that may have increased pain.           OBJECTIVE    45 (15) min Therapeutic Exercise:  [x]  See flow sheet   Rationale:      increase ROM, increase strength, increase proprioception and decrease symptoms to improve the patients ability to perform ADLs/IADLs, functional mobility and gait safely and independently without increased pain/symptoms     During TE min Patient Education:  YES  Reviewed HEP   []  Progressed/Changed HEP based on:   Cont HEP; discussed centralization/peripheralization, importance of avoidance of activities that produce/increase/peripheralize pain, use of repeated ext in standing or prone to centralize/reduce/abolish symptoms (discussed that prone may be more effective)     Other Objective/Functional Measures:    LEONORA decreased L LE pain; R SG at wall centralized pain to lower back; KANG after R SG at wall produced L LE pain; LEONORA with HOC R centralized pain to lower back and decreased lower back pain; REIL with HOC R decreased lower back pain; prone glut set further decreased lower back pain      Post Treatment Pain Level (on 0 to 10) scale:   2  / 10 L/S     ASSESSMENT    Assessment/Changes in Function:     Able to centralize symptoms to L/S with combination of LEONORA, R SG, LEONORA with HOC R and REIL with HOC R, prone glut set     []  See Progress Note/Recertification   Patient will continue to benefit from skilled PT services to modify and progress therapeutic interventions, address functional mobility deficits, address ROM deficits, address strength deficits, analyze and address soft tissue restrictions, analyze and cue movement patterns, analyze and modify body mechanics/ergonomics, assess and modify postural abnormalities and instruct in home and community integration to attain remaining goals. Progress toward goals / Updated goals:    Progressing slowly toward goals - increased pain this session:  1. Patient will demonstrate compliance with HEP for symptom management at home. Reports compliance with HEP. 2. Patient will demonstrate a negative 90-90 test to decrease stress on spine.  Cont HS stretch  3. Patient will increase R hip extension strength to 4- to increase ease and symmetry with amb.  Cont LE strengthening exercises.        PLAN    [x]  Upgrade activities as tolerated YES Continue plan of care   []  Discharge due to :    []  Other:      Therapist: Lamar Weller PT    Date: 7/25/2017 Time: 8:49 AM     Future Appointments  Date Time Provider Nito Stiles   7/27/2017 9:30 AM Lamar Weller PT [facility]   8/1/2017 7:30 AM [facility]   8/18/2017 8:30 AM Kelly Stuart PT [facility]   8/21/2017 8:30 AM Lamar Weller PT [facility]   8/25/2017 8:00 AM Kelly Stuart PT [facility]   8/28/2017 8:30 AM Lamar Weller PT [facility]   8/30/2017 8:30 AM Lamar Weller PT [facility]

## 2017-07-27 ENCOUNTER — APPOINTMENT (OUTPATIENT)
Dept: PHYSICAL THERAPY | Age: 67
End: 2017-07-27
Payer: MEDICARE

## 2017-08-01 ENCOUNTER — HOSPITAL ENCOUNTER (OUTPATIENT)
Dept: PHYSICAL THERAPY | Age: 67
Discharge: HOME | End: 2017-08-01
Payer: MEDICARE

## 2017-08-01 PROCEDURE — 97110 THERAPEUTIC EXERCISES: CPT

## 2017-08-01 NOTE — PROGRESS NOTES
PHYSICAL THERAPY - DAILY TREATMENT NOTE    Patient Name: Man Poll        Date: 2017  : 1950   YES Patient  Verified  Visit #:   5     Insurance: Payor: Ramsey Floyd / Plan: [place] MEDICARE PART A & B / Product Type: Medicare /      In time: 7:35 Out time: 8:00   Total Treatment Time: 25     Medicare Time Tracking (below)   Total Timed Codes (min):  25 1:1 Treatment Time:  25     TREATMENT AREA =  Lower back pain [M54.5]    SUBJECTIVE    Pain Level (on 0 to 10 scale):  4  / 10   Medication Changes/New allergies or changes in medical history, any new surgeries or procedures? NO     If yes, update Summary List   Subjective Functional Status/Changes:  []  No changes reported     \"I have to leave early today. I have another appointment. \"          OBJECTIVE    25 min Therapeutic Exercise:  [x]  See flow sheet   Rationale:      increase ROM and increase strength to improve the patients ability to perform ADL's with improved core stability. min Patient Education:  YES  Reviewed HEP   []  Progressed/Changed HEP based on:   Patient reports compliance     Other Objective/Functional Measures:    Pt symptoms abolished with KANG. Modified pt's HS stretch to 1/2 moon since supine belt stretch encourages L/S flexion and associated pain. See flowsheet for more details. Post Treatment Pain Level (on 0 to 10) scale:     / 10     ASSESSMENT    Assessment/Changes in Function:     Pt presents to PT able to abolish symptoms with exercise performance. Pt was educated on use of a L/S roll to prevent onset of symptoms while on vacation.      []  See Progress Note/Recertification   Patient will continue to benefit from skilled PT services to modify and progress therapeutic interventions, address functional mobility deficits, address strength deficits, analyze and address soft tissue restrictions, analyze and cue movement patterns, analyze and modify body mechanics/ergonomics and assess and modify postural abnormalities to attain remaining goals. Progress toward goals / Updated goals:     Will reassess NV>     PLAN    [x]  Upgrade activities as tolerated YES Continue plan of care   []  Discharge due to :    []  Other:      Therapist: Teofilo Ward    Date: 8/1/2017 Time: 8:06 AM     Future Appointments  Date Time Provider Nito Stiles   8/18/2017 8:30 AM Pablo Harrison [facility]   8/21/2017 8:30 AM [address] [facility]   8/25/2017 8:00 AM Pablo Harrison [facility]   8/28/2017 8:30 AM [address] [facility]   8/30/2017 8:30 AM [address] [facility]

## 2017-08-18 ENCOUNTER — HOSPITAL ENCOUNTER (OUTPATIENT)
Dept: PHYSICAL THERAPY | Age: 67
Discharge: HOME | End: 2017-08-18
Payer: MEDICARE

## 2017-08-18 PROCEDURE — 97530 THERAPEUTIC ACTIVITIES: CPT

## 2017-08-18 PROCEDURE — 97110 THERAPEUTIC EXERCISES: CPT

## 2017-08-18 PROCEDURE — G8982 BODY POS GOAL STATUS: HCPCS

## 2017-08-18 PROCEDURE — G8981 BODY POS CURRENT STATUS: HCPCS

## 2017-08-18 NOTE — PROGRESS NOTES
PHYSICAL THERAPY - DAILY TREATMENT NOTE    Patient Name: Pham Jackson        Date: 2017  : 1950   YES Patient  Verified  Visit #:     Insurance: Payor: Kenny Art / Plan: [place] MEDICARE PART A & B / Product Type: Medicare /      In time: 8:43 Out time: 9:10   Total Treatment Time: 27     Medicare Time Tracking (below)   Total Timed Codes (min):  27 1:1 Treatment Time:  27     TREATMENT AREA =  L/s spine    SUBJECTIVE    Pain Level (on 0 to 10 scale):  5  / 10   Medication Changes/New allergies or changes in medical history, any new surgeries or procedures? NO    If yes, update Summary List   Subjective Functional Status/Changes:  []  No changes reported     \"I was on vacation and I did not do my exercises. \"  Reports she is scheduled to receive an epidural injection on 2017.            OBJECTIVE    Modalities Rationale:        min [] Estim, type/location:                                      []  att     []  unatt     []  w/US     []  w/ice    []  w/heat    min []  Mechanical Traction: type/lbs                   []  pro   []  sup   []  int   []  cont    []  before manual    []  after manual    min []  Ultrasound, settings/location:      min []  Iontophoresis w/ dexamethasone, location:                                               []  take home patch       []  in clinic    min []  Ice     []  Heat    location/position:     min []  Vasopneumatic Device, press/temp:     min []  Other:    [] Skin assessment post-treatment (if applicable):    []  intact    []  redness- no adverse reaction     []redness  adverse reaction:      10 (10) min Therapeutic Exercise:  [x]  See flow sheet   Rationale:      increase ROM, increase strength, improve balance and increase proprioception to improve the patients ability to perform ADLs with ease and decrease radicular sxs .      17 (17) min Therapeutic Activity: FOTO and goal review   Rationale:      increase ROM, increase strength and improve balance to improve the patients ability to perform ADLs with ease and review goals. min Patient Education:  YES  Reviewed HEP   []  Progressed/Changed HEP based on: Other Objective/Functional Measures:    Patient 13 minutes late for appointment. Strength (MMT)          Right        Left    Psoas (L2,3)  4 4   Quads (L3) 4+ 4+   Ant tib(L4) 4+ 4+   Hip Ext (S1, S2) NT NT   Glut Med(L5) NT NT   Hamstrings(S1,S2) 4+ 4+        Post Treatment Pain Level (on 0 to 10) scale:   NR  / 10     ASSESSMENT    Assessment/Changes in Function:     See Progress note     []  See Progress Note/Recertification   Patient will continue to benefit from skilled PT services to modify and progress therapeutic interventions, address functional mobility deficits, address ROM deficits, address strength deficits, analyze and address soft tissue restrictions, analyze and cue movement patterns, analyze and modify body mechanics/ergonomics and assess and modify postural abnormalities to attain remaining goals.      Progress toward goals / Updated goals:    See progress note     PLAN    []  Upgrade activities as tolerated YES Continue plan of care   []  Discharge due to :    []  Other:      Therapist: Bebo Faria PT    Date: 8/18/2017 Time: 9:04 AM     Future Appointments  Date Time Provider Nito Stiles   8/21/2017 8:30 AM Camilo Burk PT [facility]   8/25/2017 8:00 AM Bebo Faria PT [facility]   8/28/2017 8:30 AM Camilo Burk PT [facility]   8/30/2017 8:30 AM Camilo Burk PT [facility]
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
## 2017-08-25 ENCOUNTER — HOSPITAL ENCOUNTER (OUTPATIENT)
Dept: PHYSICAL THERAPY | Age: 67
Discharge: HOME | End: 2017-08-25
Payer: MEDICARE

## 2017-08-25 PROCEDURE — 97110 THERAPEUTIC EXERCISES: CPT

## 2017-08-25 NOTE — PROGRESS NOTES
PHYSICAL THERAPY - DAILY TREATMENT NOTE    Patient Name: Criselda Grey        Date: 2017  : 1950   YES Patient  Verified  Visit #:     Insurance: Payor: [place] MEDICARE / Plan: [place] MEDICARE PART A & B / Product Type: Medicare /      In time: 8:10 Out time: 8:40   Total Treatment Time: 30     Medicare Time Tracking (below)   Total Timed Codes (min):  30 1:1 Treatment Time:  30     TREATMENT AREA =  L/S    SUBJECTIVE    Pain Level (on 0 to 10 scale):  0  / 10   Medication Changes/New allergies or changes in medical history, any new surgeries or procedures? NO    If yes, update Summary List   Subjective Functional Status/Changes:  []  No changes reported     \"I got an epidural injection on Wednesday. It has really helped decrease pain. OBJECTIVE    30 min Therapeutic Exercise:  [x]  See flow sheet   Rationale:      increase ROM and increase strength to improve the patients ability to perform ADL's, gait, and functional mobility with decreased pain       min Patient Education:  YES  Reviewed HEP   []  Progressed/Changed HEP based on:   Cont HEP     Other Objective/Functional Measures: Added SB # 1 and standing hip abd/ext. Post Treatment Pain Level (on 0 to 10) scale:   0  / 10     ASSESSMENT    Assessment/Changes in Function:     Tolerated exercise progression without c/o's.     []  See Progress Note/Recertification   Patient will continue to benefit from skilled PT services to modify and progress therapeutic interventions, address functional mobility deficits, address ROM deficits, address strength deficits, analyze and address soft tissue restrictions, analyze and cue movement patterns, analyze and modify body mechanics/ergonomics, assess and modify postural abnormalities and instruct in home and community integration to attain remaining goals.      Progress toward goals / Updated goals:    · Goals for this certification period include and are to be achieved in   4-6 weeks: 1. Patient will demonstrate a negative 90-90 test to decrease stress on spine. Cont HS stretch  2. Patient will increase R hip abduction strength to 4 to increase ease and symmetry with amb. Cont LE strengthening  3.  Patient will be independent with HEP to self-manage and prevent symptoms upon DC. Compliant with HEP  4.  Patient will improve FOTO score to 53 points to indicate improved functional status. 5.  Patient will verbalized ability to sit for at least 2 hours without increased symptoms to perform work related activities. NT   6.  Patient will demonstrate ability to complete 2 flights of stairs with a little bit of difficulty per FOTO to perform stair negotiation with greater ease.   NT     PLAN    [x]  Upgrade activities as tolerated YES Continue plan of care   []  Discharge due to :    []  Other:      Therapist: Kelly Stuart PT    Date: 8/25/2017 Time: 8:21 AM     Future Appointments  Date Time Provider Nito Stiles   8/28/2017 8:30 AM Lamar Weller PT [facility]   8/30/2017 8:30 AM Lamar Weller PT [facility]

## 2017-08-28 ENCOUNTER — HOSPITAL ENCOUNTER (OUTPATIENT)
Dept: PHYSICAL THERAPY | Age: 67
Discharge: HOME | End: 2017-08-28
Payer: MEDICARE

## 2017-08-28 PROCEDURE — 97110 THERAPEUTIC EXERCISES: CPT

## 2017-08-28 NOTE — PROGRESS NOTES
PHYSICAL THERAPY - DAILY TREATMENT NOTE    Patient Name: Joaquina Beauchamp        Date: 2017  : 1950   YES Patient  Verified  Visit #:     Insurance: Payor: Leighann Christine / Plan: [place] MEDICARE PART A & B / Product Type: Medicare /      In time: 8:35 Out time: 9:05   Total Treatment Time: 30     Medicare Time Tracking (below)   Total Timed Codes (min):  30 1:1 Treatment Time:  30     TREATMENT AREA =  Lower back pain [M54.5]  SUBJECTIVE    Pain Level (on 0 to 10 scale):  3  / 10   Medication Changes/New allergies or changes in medical history, any new surgeries or procedures? NO    If yes, update Summary List   Subjective Functional Status/Changes:  []  No changes reported     Pt reports 3/10 LBP, L lower leg. Pt reports that she had epidural injection last week (Wednesday). Pt reports that she did more walking than usual on Saturday. Pt reports NE with KANG. Pt reports decrease in pain intensity to 2/10 after stationary bike. Pt reports NE with LEONORA. Pt reports decrease in L lower leg pain with REIL, LEONORA with HOC R.   Pt reports decrease in L lower leg pain, increase in LBP with REIL with HOC R.       OBJECTIVE    30 min Therapeutic Exercise:  [x]  See flow sheet   Rationale:      increase ROM, increase strength and decrease symptoms to improve the patients ability to perform ADLs/IADLs, functional mobility and gait safely and independently without increased pain/symptoms     During TE min Patient Education:  YES  Reviewed HEP   []  Progressed/Changed HEP based on:   Cont HEP     Other Objective/Functional Measures:    Exercises per flowsheet  Able to centralize pain with combination of REIL, LEONORA with HOC R and REIL with HOC R  Pain abolished after prone glut sets and bridge     Post Treatment Pain Level (on 0 to 10) scale:   0  / 10     ASSESSMENT    Assessment/Changes in Function:     Tolerated exercises without c/o increased pain; able to decrease/centralize/abolish pain with exercises     []  See Progress Note/Recertification   Patient will continue to benefit from skilled PT services to modify and progress therapeutic interventions, address functional mobility deficits, address ROM deficits, address strength deficits, analyze and address soft tissue restrictions, analyze and cue movement patterns, analyze and modify body mechanics/ergonomics, assess and modify postural abnormalities and instruct in home and community integration to attain remaining goals. Progress toward goals / Updated goals:    Progressing slowly toward goals: 1. Patient will demonstrate a negative 90-90 test to decrease stress on spine. Cont HS stretch  2. Patient will increase R hip abduction strength to 4 to increase ease and symmetry with amb. Cont LE strengthening  3.  Patient will be independent with HEP to self-manage and prevent symptoms upon DC. Compliant with HEP  4.  Patient will improve FOTO score to 53 points to indicate improved functional status. 5.  Patient will verbalized ability to sit for at least 2 hours without increased symptoms to perform work related activities. NT   6.  Patient will demonstrate ability to complete 2 flights of stairs with a little bit of difficulty per FOTO to perform stair negotiation with greater ease.   NT       PLAN    [x]  Upgrade activities as tolerated YES Continue plan of care   []  Discharge due to :    []  Other:      Therapist: Barrett Gardner PT    Date: 8/28/2017 Time: 8:37 AM     Future Appointments  Date Time Provider Nito Stiles   8/30/2017 8:30 AM Barrett Gardner PT [facility]

## 2017-08-30 ENCOUNTER — APPOINTMENT (OUTPATIENT)
Dept: PHYSICAL THERAPY | Age: 67
End: 2017-08-30
Payer: MEDICARE

## 2017-09-06 ENCOUNTER — HOSPITAL ENCOUNTER (OUTPATIENT)
Dept: PHYSICAL THERAPY | Age: 67
Discharge: HOME | End: 2017-09-06
Payer: MEDICARE

## 2017-09-06 PROCEDURE — 97110 THERAPEUTIC EXERCISES: CPT

## 2017-09-06 NOTE — PROGRESS NOTES
PHYSICAL THERAPY - DAILY TREATMENT NOTE    Patient Name: Papito Miguel        Date: 2017  : 1950   YES Patient  Verified  Visit #:   10   of   19  Insurance: Payor: Vera Staff / Plan: [place] MED[place] & B / Product Type: Medicare /      In time: 9:35 Out time: 10:05   Total Treatment Time: 30     Medicare Time Tracking (below)   Total Timed Codes (min):  30 1:1 Treatment Time:  25     TREATMENT AREA =  Lower back pain [M54.5]  SUBJECTIVE    Pain Level (on 0 to 10 scale):  0  / 10   Medication Changes/New allergies or changes in medical history, any new surgeries or procedures? NO    If yes, update Summary List   Subjective Functional Status/Changes:  []  No changes reported     Pt reports no pain, reports that she has been doing her exercises at home. OBJECTIVE    30 (25) min Therapeutic Exercise:  [x]  See flow sheet   Rationale:      increase ROM, increase strength and increase proprioception to improve the patients ability to perform ADLs/IADLs, functional mobility and gait safely and independently without increased pain/symptoms     During TE min Patient Education:  YES  Reviewed HEP   []  Progressed/Changed HEP based on:   Cont HEP     Other Objective/Functional Measures:    Exercises per flowsheet     Post Treatment Pain Level (on 0 to 10) scale:   0  / 10     ASSESSMENT    Assessment/Changes in Function:     Tolerated exercises without complaints     []  See Progress Note/Recertification   Patient will continue to benefit from skilled PT services to modify and progress therapeutic interventions, address functional mobility deficits, address ROM deficits, address strength deficits, analyze and address soft tissue restrictions, analyze and cue movement patterns, analyze and modify body mechanics/ergonomics, assess and modify postural abnormalities and instruct in home and community integration to attain remaining goals.    Progress toward goals / Updated goals:    Progressing toward goals: 1. Patient will demonstrate a negative 90-90 test to decrease stress on spine. Cont HS stretch  2. Patient will increase R hip abduction strength to 4 to increase ease and symmetry with amb. Cont LE strengthening  3.  Patient will be independent with HEP to self-manage and prevent symptoms upon DC. Compliant with HEP  4.  Patient will improve FOTO score to 53 points to indicate improved functional status. 5.  Patient will verbalized ability to sit for at least 2 hours without increased symptoms to perform work related activities.  NT   6.  Patient will demonstrate ability to complete 2 flights of stairs with a little bit of difficulty per FOTO to perform stair negotiation with greater ease.  NT       PLAN    [x]  Upgrade activities as tolerated YES Continue plan of care   []  Discharge due to :    []  Other:      Therapist: Leon Champion PT    Date: 9/6/2017 Time: 9:40 AM     Future Appointments  Date Time Provider Nito Stiles   9/8/2017 9:00 AM Deandre Swenson PT [facility]   9/11/2017 9:00 AM Leon Champion PT [facility]   9/15/2017 9:30 AM Deandre Swenson PT [facility]   9/25/2017 9:00 AM Deandre Swenson PT [facility]

## 2017-09-08 ENCOUNTER — HOSPITAL ENCOUNTER (OUTPATIENT)
Dept: PHYSICAL THERAPY | Age: 67
Discharge: HOME | End: 2017-09-08
Payer: MEDICARE

## 2017-09-08 PROCEDURE — 97110 THERAPEUTIC EXERCISES: CPT

## 2017-09-08 NOTE — PROGRESS NOTES
PHYSICAL THERAPY - DAILY TREATMENT NOTE    Patient Name: Papito Miguel        Date: 2017  : 1950   YES Patient  Verified  Visit #:     Insurance: Payor: Vera Staff / Plan: [place] MEDICARE PART A & B / Product Type: Medicare /      In time: 9:05 Out time: 9:45   Total Treatment Time: 40     Medicare Time Tracking (below)   Total Timed Codes (min):  40 1:1 Treatment Time:  30     TREATMENT AREA =  Lower back pain [M54.5]    SUBJECTIVE    Pain Level (on 0 to 10 scale):  0  / 10   Medication Changes/New allergies or changes in medical history, any new surgeries or procedures? NO    If yes, update Summary List   Subjective Functional Status/Changes:  []  No changes reported     Continues to report no pain. States she feels a little discomfort every now and then but overall the pain is much better since the injection. OBJECTIVE      40 (30) min Therapeutic Exercise:  [x]  See flow sheet   Rationale:      increase ROM and increase strength to improve the patients ability to perform ADL's, gait, and functional mobility with decreased pain. min Patient Education:  YES  Reviewed HEP   []  Progressed/Changed HEP based on:   Progressed HEP per handout in paper chart. Other Objective/Functional Measures: Added pallof press #1, tband walk outs, and toe tapw # 1 for core stability.      Post Treatment Pain Level (on 0 to 10) scale:   0  / 10     ASSESSMENT    Assessment/Changes in Function:     Tolerated exercise progression without c/o's.     []  See Progress Note/Recertification   Patient will continue to benefit from skilled PT services to modify and progress therapeutic interventions, address functional mobility deficits, address ROM deficits, address strength deficits, analyze and address soft tissue restrictions, analyze and cue movement patterns, analyze and modify body mechanics/ergonomics, assess and modify postural abnormalities and instruct in home and community integration to attain remaining goals. Progress toward goals / Updated goals:    Progressing toward goals: 1. Patient will demonstrate a negative 90-90 test to decrease stress on spine. Cont HS stretch  2. Patient will increase R hip abduction strength to 4 to increase ease and symmetry with amb. Cont LE strengthening  3.  Patient will be independent with HEP to self-manage and prevent symptoms upon DC. Compliant with HEP  4.  Patient will improve FOTO score to 53 points to indicate improved functional status. 5.  Patient will verbalized ability to sit for at least 2 hours without increased symptoms to perform work related activities.  NT   6.  Patient will demonstrate ability to complete 2 flights of stairs with a little bit of difficulty per FOTO to perform stair negotiation with greater ease.  NT     PLAN    [x]  Upgrade activities as tolerated YES Continue plan of care   []  Discharge due to :    []  Other:      Therapist: Sheila Nguyen PT    Date: 9/8/2017 Time: 9:50 AM     Future Appointments  Date Time Provider Nito Stiles   9/11/2017 9:00 AM Janette Palomo PT [facility]   9/15/2017 9:30 AM Sheila Nguyen PT [facility]   9/25/2017 9:00 AM Sheila Nguyen PT [facility]

## 2017-09-11 ENCOUNTER — APPOINTMENT (OUTPATIENT)
Dept: PHYSICAL THERAPY | Age: 67
End: 2017-09-11
Payer: MEDICARE

## 2017-09-15 ENCOUNTER — HOSPITAL ENCOUNTER (OUTPATIENT)
Dept: PHYSICAL THERAPY | Age: 67
Discharge: HOME | End: 2017-09-15
Payer: MEDICARE

## 2017-09-15 PROCEDURE — 97110 THERAPEUTIC EXERCISES: CPT

## 2017-09-15 NOTE — PROGRESS NOTES
PHYSICAL THERAPY - DAILY TREATMENT NOTE    Patient Name: Gill Haskins        Date: 9/15/2017  : 1950   YES Patient  Verified  Visit #:     Insurance: Payor: Renny Frankel / Plan: [place] MEDICARE PART A & B / Product Type: Medicare /      In time: 9:30 Out time: 10:22   Total Treatment Time: 52     Medicare Time Tracking (below)   Total Timed Codes (min):  52 1:1 Treatment Time:  52     TREATMENT AREA =  Lower back pain [M54.5]    SUBJECTIVE    Pain Level (on 0 to 10 scale):  0  / 10   Medication Changes/New allergies or changes in medical history, any new surgeries or procedures? 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 States she sat for ~ 3 1/2 hours which she feels contributed to her pain. States her LB was still painful yesterday but feels better today. Reports she is walking for exercises (~ 15 minutes). OBJECTIVE    52 min Therapeutic Exercise:  [x]  See flow sheet   Rationale:      increase ROM and increase strength to improve the patients ability to perform ADL's, gait, and functional mobility with decreased pain. min Patient Education:  YES  Reviewed HEP   []  Progressed/Changed HEP based on:   Cont HEP     Other Objective/Functional Measures:     Added pallof press #2, seated , and quad LE lift     Post Treatment Pain Level (on 0 to 10) scale:   0  / 10     ASSESSMENT    Assessment/Changes in Function:     Tolerated exercise progression without c/o's.     []  See Progress Note/Recertification   Patient will continue to benefit from skilled PT services to modify and progress therapeutic interventions, address functional mobility deficits, address ROM deficits, address strength deficits, analyze and address soft tissue restrictions, analyze and cue movement patterns, analyze and modify body mechanics/ergonomics, assess and modify postural abnormalities and instruct in home and community integration to attain remaining goals. Progress toward goals / Updated goals:    Progressing toward goals: 1. Patient will demonstrate a negative 90-90 test to decrease stress on spine. Cont HS stretch  2. Patient will increase R hip abduction strength to 4 to increase ease and symmetry with amb. Cont LE strengthening  3.  Patient will be independent with HEP to self-manage and prevent symptoms upon DC. Compliant with HEP  4.  Patient will improve FOTO score to 53 points to indicate improved functional status. 5.  Patient will verbalized ability to sit for at least 2 hours without increased symptoms to perform work related activities.  NT   6.  Patient will demonstrate ability to complete 2 flights of stairs with a little bit of difficulty per FOTO to perform stair negotiation with greater ease.  NT     PLAN    [x]  Upgrade activities as tolerated YES Continue plan of care   []  Discharge due to :    [x]  Other: Reassess NV     Therapist: Bebo Faria PT    Date: 9/15/2017 Time: 9:39 AM     Future Appointments  Date Time Provider Nito Stiles   9/25/2017 9:00 AM Bebo Faria PT [facility]

## 2017-09-25 ENCOUNTER — APPOINTMENT (OUTPATIENT)
Dept: PHYSICAL THERAPY | Age: 67
End: 2017-09-25
Payer: MEDICARE

## 2017-10-02 ENCOUNTER — HOSPITAL ENCOUNTER (OUTPATIENT)
Dept: PHYSICAL THERAPY | Age: 67
Discharge: HOME | End: 2017-10-02
Payer: MEDICARE

## 2017-10-02 PROCEDURE — 97110 THERAPEUTIC EXERCISES: CPT

## 2017-10-02 PROCEDURE — G8978 MOBILITY CURRENT STATUS: HCPCS

## 2017-10-02 PROCEDURE — 97530 THERAPEUTIC ACTIVITIES: CPT

## 2017-10-02 PROCEDURE — G8979 MOBILITY GOAL STATUS: HCPCS

## 2017-10-02 NOTE — PROGRESS NOTES
PHYSICAL THERAPY - DAILY TREATMENT NOTE    Patient Name: Alline Claude        Date: 10/2/2017  : 1950   YES Patient  Verified  Visit #:   15   of   19  Insurance: Payor: Fabio Lagunas / Plan: [place] MEDICARE PART A & B / Product Type: Medicare /      In time: 9:10 Out time: 9:55   Total Treatment Time: 45     Medicare Time Tracking (below)   Total Timed Codes (min):  45 1:1 Treatment Time:  45     TREATMENT AREA =  Lower back pain [M54.5]  SUBJECTIVE    Pain Level (on 0 to 10 scale):  0  / 10   Medication Changes/New allergies or changes in medical history, any new surgeries or procedures? NO    If yes, update Summary List   Subjective Functional Status/Changes:  []  No changes reported     Pt reports that she has not been in for the past 2 weeks because she has been out of town, and her sciatica has been acting up. Pt reports that the epidural injection that she had a month ago stopped working. Pt reports that she had acupuncture while she was out of town. Pt reports that she went back to see Dr. Raquel Ly (pain management) and he is going to give her another epidural injection. Pt reports that she has been doing her exercises. Pt reports that if she overdoes things, especially with prolonged walking or sitting, her symptoms flare up again. Pt reports that she thinks that her exercises work some. Pt reports that she feels that she is worse since taking her trip to [place]. Pt reports that she can sit for up to an hour at times without increased symptoms, sometimes she can only sit for 30 minutes without increased pain.           OBJECTIVE    35 min Therapeutic Exercise:  [x]  See flow sheet   Rationale:      increase ROM, increase strength and decrease symptoms to improve the patients ability to perform ADLs/IADLs, functional mobility and gait safely and independently without increased pain/symptoms     10 min Therapeutic Activity: FOTO   Rationale: assess ADL/IADL function, functional mobility and gait to improve the patient's ability to perform ADLs/IADLs, functional mobility and gait safely and independently without increased pain/symptoms      During TE/TA min Patient Education:  YES  Reviewed HEP   []  Progressed/Changed HEP based on:   Cont HEP - updated copy in chart; discussed use of lumbar roll in sitting, avoidance of prolonged sitting and use of repeated L/S extension exercises every 1-2 hours while awake to manage symptoms     Other Objective/Functional Measures:    Arrived 8' late    Strength (MMT)       Right   Left    Psoas (L2,3)  4 4   Quads (L3) 5 5   Ant tib(L4) 5 5   Hip Ext (S1, S2) 4- 4-   Glut Med(L5) 4- 4-   Hamstrings(S1,S2) 5 5      90/90 HS test = 25 B    FOTO = 48/100    Pt reports 3/10 L lower leg pain with bridge, which resolves with LEONORA     Post Treatment Pain Level (on 0 to 10) scale:   0  / 10     ASSESSMENT    Assessment/Changes in Function:     See progress note     [x]  See Progress Note/Recertification   Patient will continue to benefit from skilled PT services: see progress note   Progress toward goals / Updated goals:    See progress note     PLAN    [x]  Upgrade activities as tolerated YES Continue plan of care   []  Discharge due to :    []  Other:      Therapist: Tracie Castillo, PT    Date: 10/2/2017 Time: 9:09 AM     No future appointments.
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
## 2017-10-05 ENCOUNTER — APPOINTMENT (OUTPATIENT)
Dept: PHYSICAL THERAPY | Age: 67
End: 2017-10-05
Payer: MEDICARE

## 2017-10-17 ENCOUNTER — APPOINTMENT (OUTPATIENT)
Dept: PHYSICAL THERAPY | Age: 67
End: 2017-10-17
Payer: MEDICARE

## 2017-10-19 ENCOUNTER — APPOINTMENT (OUTPATIENT)
Dept: PHYSICAL THERAPY | Age: 67
End: 2017-10-19
Payer: MEDICARE

## 2017-10-24 ENCOUNTER — HOSPITAL ENCOUNTER (OUTPATIENT)
Dept: PHYSICAL THERAPY | Age: 67
Discharge: HOME | End: 2017-10-24
Payer: MEDICARE

## 2017-10-24 PROCEDURE — 97110 THERAPEUTIC EXERCISES: CPT

## 2017-10-24 NOTE — PROGRESS NOTES
PHYSICAL THERAPY - DAILY TREATMENT NOTE    Patient Name: Sable Apley        Date: 10/24/2017  : 1950   YES Patient  Verified  Visit #:     Insurance: Payor: Angelica Guadalupe / Plan: [place] MEDICARE PART A & B / Product Type: Medicare /      In time: 8:40 Out time: 9:15   Total Treatment Time: 35     Medicare Time Tracking (below)   Total Timed Codes (min):  35 1:1 Treatment Time:  25     TREATMENT AREA =  Lower back pain [M54.5]    SUBJECTIVE    Pain Level (on 0 to 10 scale):  2  / 10   Medication Changes/New allergies or changes in medical history, any new surgeries or procedures? NO    If yes, update Summary List   Subjective Functional Status/Changes:  []  No changes reported     States she had another epidural ~ 2 1/2 weeks ago. States she has received more pain relief from this most recent epidural.  However, she still has pain with prolonged sitting/standing. States sitting > 45 minutes and standing > 45 minutes. States she hasn't been to therapy in about 3 weeks due to epidural injection and availability of therapists. OBJECTIVE    35 (25) min Therapeutic Exercise:  [x]  See flow sheet   Rationale:      increase ROM and increase strength to improve the patients ability to perform ADL's, gait, and functional mobility with decreased pain. min Patient Education:  YES  Reviewed HEP   []  Progressed/Changed HEP based on:   Cont HEP     Other Objective/Functional Measures:                                          Strength (1-5)  Hip Left Right   Flexion 4+ 4+   Extension     Abduction 4 4   Knee Left Right   Extension 5 5   Flexion 5 5       Discussed aquatic therapy - patient agreeable to trial aquatic therapy and paperwork completed. Post Treatment Pain Level (on 0 to 10) scale:   0  / 10     ASSESSMENT    Assessment/Changes in Function:     LE strength measurements improving. Although, patient still limited with prolonged sitting and standing.      []  See Progress Note/Recertification   Patient will continue to benefit from skilled PT services to modify and progress therapeutic interventions, address functional mobility deficits, address ROM deficits, address strength deficits, analyze and address soft tissue restrictions, analyze and cue movement patterns, analyze and modify body mechanics/ergonomics, assess and modify postural abnormalities and instruct in home and community integration to attain remaining goals. Progress toward goals / Updated goals:    · Goals for this certification period include and are to be achieved in   2-4  weeks: 1. Patient will demonstrate a negative 90-90 test to decrease stress on spine. 2. Patient will increase R hip abduction strength to 4 to increase ease and symmetry with amb. R hip abd strength 4/5  3.  Patient will be independent with HEP to self-manage and prevent symptoms upon DC. Compliant with HEP  4.  Patient will improve FOTO score to 53 points to indicate improved functional status. 5.  Patient will verbalized ability to sit for at least 2 hours without increased symptoms to perform work related activities  6. Kvng Cava will demonstrate ability to complete 2 flights of stairs with a little bit of difficulty per FOTO to perform stair negotiation with greater ease.       PLAN    [x]  Upgrade activities as tolerated YES Continue plan of care   []  Discharge due to :    []  Other:      Therapist: Angelica May PT    Date: 10/24/2017 Time: 8:40 AM     Future Appointments  Date Time Provider Nito Stiles   10/26/2017 9:30 AM Dontae Bolivar PT [facility]   10/31/2017 8:30 AM Angelica May PT [facility]

## 2017-10-26 ENCOUNTER — APPOINTMENT (OUTPATIENT)
Dept: PHYSICAL THERAPY | Age: 67
End: 2017-10-26
Payer: MEDICARE

## 2017-10-31 ENCOUNTER — HOSPITAL ENCOUNTER (OUTPATIENT)
Dept: PHYSICAL THERAPY | Age: 67
Discharge: HOME | End: 2017-10-31
Payer: MEDICARE

## 2017-10-31 PROCEDURE — G8979 MOBILITY GOAL STATUS: HCPCS

## 2017-10-31 PROCEDURE — G8980 MOBILITY D/C STATUS: HCPCS

## 2017-10-31 PROCEDURE — 97110 THERAPEUTIC EXERCISES: CPT
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
## 2017-10-31 NOTE — PROGRESS NOTES
PHYSICAL THERAPY - DAILY TREATMENT NOTE    Patient Name: Kayley Masterson        Date: 10/31/2017  : 1950   YES Patient  Verified  Visit #:   15   of   19  Insurance: [place]or: [place] / Plan: [place] MED[place]ARE PART A & B / Product Type: Medicare /      In time: 8:40 Out time: 9:05   Total Treatment Time: 25     Medicare Time Tracking (below)   Total Timed Codes (min):  25 1:1 Treatment Time:  25     TREATMENT AREA =  Lower back pain [M54.5]    SUBJECTIVE    Pain Level (on 0 to 10 scale):  0  / 10   Medication Changes/New allergies or changes in medical history, any new surgeries or procedures? NO    If yes, update Summary List   Subjective Functional Status/Changes:  []  No changes reported     States she can sit for ~ 1 hour before pain makes her move. Reports her back has been feeling better. States she walked 11,000 steps over the weekend and had a little increased pain, but exercises helped to control the pain. OBJECTIVE    25 min Therapeutic Exercise:  [x]  See flow sheet   Rationale:      increase ROM and increase strength to improve the patients ability to perform ADL's, gait, and functional mobility with decreased pain.       min Patient Education:  YES  Reviewed HEP   []  Progressed/Changed HEP based on:   Cont current HEP     Other Objective/Functional Measures:    90/90 Hamstring test: 10 deg B                                           Strength (1-5)  Hip Left Right   Flexion 5 5   Extension 4+ 4+   Abduction 4+ 4+   Adduction     ER     IR     Knee Left Right   Extension     Flexion       FOTO = 70     Post Treatment Pain Level (on 0 to 10) scale:   0  / 10     ASSESSMENT    Assessment/Changes in Function:     See DC note to MD     []  See Progress Note/Recertification      Progress toward goals / Updated goals:    See DC note to MD     PLAN    []  Upgrade activities as tolerated YES Continue plan of care   [x]  Discharge due to : Goals met   []  Other:      Therapist: Colleen Hinojosa Reese, PT    Date: 10/31/2017 Time: 8:43 AM     No future appointments.

## 2018-05-28 ENCOUNTER — APPOINTMENT (OUTPATIENT)
Dept: GENERAL RADIOLOGY | Age: 68
End: 2018-05-28
Attending: EMERGENCY MEDICINE
Payer: MEDICARE

## 2018-05-28 ENCOUNTER — HOSPITAL ENCOUNTER (EMERGENCY)
Age: 68
Discharge: HOME | End: 2018-05-28
Attending: EMERGENCY MEDICINE
Payer: MEDICARE

## 2018-05-28 VITALS
BODY MASS INDEX: 37.99 KG/M2 | SYSTOLIC BLOOD PRESSURE: 134 MMHG | WEIGHT: 228 LBS | RESPIRATION RATE: 16 BRPM | DIASTOLIC BLOOD PRESSURE: 64 MMHG | TEMPERATURE: 99.5 F | HEART RATE: 86 BPM | OXYGEN SATURATION: 98 % | HEIGHT: 65 IN

## 2018-05-28 DIAGNOSIS — R11.0 NAUSEA WITHOUT VOMITING: ICD-10-CM

## 2018-05-28 DIAGNOSIS — R10.84 ABDOMINAL PAIN, GENERALIZED: ICD-10-CM

## 2018-05-28 DIAGNOSIS — K59.00 CONSTIPATION, UNSPECIFIED CONSTIPATION TYPE: Primary | ICD-10-CM

## 2018-05-28 PROCEDURE — 74022 RADEX COMPL AQT ABD SERIES: CPT

## 2018-05-28 PROCEDURE — 74011250636 HC RX REV CODE- 250/636: Performed by: PHYSICIAN ASSISTANT

## 2018-05-28 PROCEDURE — 74011250636 HC RX REV CODE- 250/636: Performed by: EMERGENCY MEDICINE

## 2018-05-28 PROCEDURE — 96374 THER/PROPH/DIAG INJ IV PUSH: CPT

## 2018-05-28 PROCEDURE — 96361 HYDRATE IV INFUSION ADD-ON: CPT

## 2018-05-28 PROCEDURE — 99284 EMERGENCY DEPT VISIT MOD MDM: CPT

## 2018-05-28 PROCEDURE — 96372 THER/PROPH/DIAG INJ SC/IM: CPT

## 2018-05-28 RX ORDER — PROMETHAZINE HYDROCHLORIDE 25 MG/1
25 TABLET ORAL
Qty: 12 TAB | Refills: 0 | Status: SHIPPED | OUTPATIENT
Start: 2018-05-28 | End: 2018-12-11

## 2018-05-28 RX ORDER — PROMETHAZINE HYDROCHLORIDE 25 MG/ML
12.5 INJECTION, SOLUTION INTRAMUSCULAR; INTRAVENOUS ONCE
Status: COMPLETED | OUTPATIENT
Start: 2018-05-28 | End: 2018-05-28

## 2018-05-28 RX ORDER — ONDANSETRON 2 MG/ML
4 INJECTION INTRAMUSCULAR; INTRAVENOUS
Status: COMPLETED | OUTPATIENT
Start: 2018-05-28 | End: 2018-05-28

## 2018-05-28 RX ADMIN — SODIUM CHLORIDE 1000 ML: 900 INJECTION, SOLUTION INTRAVENOUS at 02:36

## 2018-05-28 RX ADMIN — ONDANSETRON 4 MG: 2 INJECTION INTRAMUSCULAR; INTRAVENOUS at 02:36

## 2018-05-28 RX ADMIN — PROMETHAZINE HYDROCHLORIDE 12.5 MG: 25 INJECTION INTRAMUSCULAR; INTRAVENOUS at 04:22

## 2018-05-28 NOTE — ED PROVIDER NOTES
EMERGENCY DEPARTMENT HISTORY AND PHYSICAL EXAM    Date: 5/28/2018  Patient Name: Gretta Pettit    History of Presenting Illness     Chief Complaint   Patient presents with    Constipation         History Provided By: Patient    Chief Complaint: Constipation  Duration: 6 Days  Timing:  Gradual  Location: abdomen  Quality: Cramping and Dull  Severity: Mild  Modifying Factors: None  Associated Symptoms: nausea      HPI: Gretta Pettit is a 80 y.o. female with a PMH of Breast CA, Kidney stones who presents to the ER via EMS c/o constipation. Patient reports her symptoms began about 6 days ago and have continued to worsen. She reports some associated nausea and decreased appetite. She has tried taking OTC laxatives, stool softeners and enemas with no relief. She denied any fevers, chills, SOB, chest pain, vomiting, dysuria, bloody stools and has no other complaints. Patient reports a hx of chronic constipation, however states it has never felt this bad. She admits to passing flatulence multiple times today. PCP: Julissa Floyd MD    Current Facility-Administered Medications   Medication Dose Route Frequency Provider Last Rate Last Dose    sodium chloride 0.9 % bolus infusion 1,000 mL  1,000 mL IntraVENous ONCE Gaetano Haynes PA-C        ondansetron [facility]) injection 4 mg  4 mg IntraVENous NOW Gaetano Haynes PA-C         Current Outpatient Prescriptions   Medication Sig Dispense Refill    ondansetron hcl (ZOFRAN) 4 mg tablet Take 1 Tab by mouth every six (6) hours as needed for Nausea for 10 doses. 10 Tab 0    HYDROcodone-acetaminophen (NORCO) 5-325 mg per tablet Take 1 Tab by mouth every twelve (12) hours. 30 Tab 0    multivitamin (ONE A DAY) tablet Take 1 Tab by mouth daily.  ergocalciferol (VITAMIN D2) 50,000 unit capsule Take 50,000 Units by mouth.          Past History     Past Medical History:  Past Medical History:   Diagnosis Date    Breast cancer ([facility] [address]     Kidney stone     Urinary tract infection, site not specified        Past Surgical History:  Past Surgical History:   Procedure Laterality Date    HX MASTECTOMY      double mastectomy       Family History:  Family History   Problem Relation Age of Onset    Cancer Mother      breast       Social History:  Social History   Substance Use Topics    Smoking status: Never Smoker    Smokeless tobacco: Never Used    Alcohol use Yes      Comment: occassionally       Allergies: Allergies   Allergen Reactions    Ciprofloxacin Rash    Codeine Nausea and Vomiting    Pcn [Penicillins] Rash and Itching    Sulfa (Sulfonamide Antibiotics) Rash         Review of Systems   Review of Systems   Constitutional: Negative for chills, fatigue and fever. HENT: Negative. Negative for sore throat. Eyes: Negative. Respiratory: Negative for cough and shortness of breath. Cardiovascular: Negative for chest pain and palpitations. Gastrointestinal: Positive for abdominal pain, constipation and vomiting. Negative for nausea. Genitourinary: Negative for dysuria. Musculoskeletal: Negative. Skin: Negative. Neurological: Negative for dizziness, weakness, light-headedness and headaches. Psychiatric/Behavioral: Negative. All other systems reviewed and are negative. Physical Exam     Vitals:    05/28/18 0051   BP: 131/70   Pulse: 86   Resp: 16   Temp: 99.5 °F (37.5 °C)   SpO2: 98%   Weight: 103.4 kg (228 lb)   Height: 5' 5\" (1.651 m)     Physical Exam   Constitutional: She is oriented to person, place, and time. She appears well-developed and well-nourished. No distress. HENT:   Head: Normocephalic and atraumatic. Mouth/Throat: Oropharynx is clear and moist.   Eyes: Conjunctivae are normal. No scleral icterus. Neck: Neck supple. No JVD present. No tracheal deviation present. Cardiovascular: Normal rate, regular rhythm and normal heart sounds. Pulmonary/Chest: Effort normal and breath sounds normal. No respiratory distress.  She has no wheezes. Abdominal: Soft. Bowel sounds are normal. She exhibits distension. She exhibits no mass. There is generalized tenderness. There is no rebound, no guarding and no CVA tenderness. No peritoneal signs   Musculoskeletal: Normal range of motion. Neurological: She is alert and oriented to person, place, and time. She has normal strength. Gait normal. GCS eye subscore is 4. GCS verbal subscore is 5. GCS motor subscore is 6. Skin: Skin is warm and dry. She is not diaphoretic. Psychiatric: She has a normal mood and affect. Nursing note and vitals reviewed. Diagnostic Study Results     Labs -   No results found for this or any previous visit (from the past 12 hour(s)). Radiologic Studies -   XR ABD ACUTE W 1 V CHEST    (Results Pending)     CT Results  (Last 48 hours)    None        CXR Results  (Last 48 hours)    None            Medical Decision Making   I am the first provider for this patient. I reviewed the vital signs, available nursing notes, past medical history, past surgical history, family history and social history. Vital Signs-Reviewed the patient's vital signs. Records Reviewed: Nursing Notes and Old Medical Records    ED Course:   1:49 AM  81 y/o female c/o acute on chronic constipation x 6 days. Has tried taking OTC meds with minimal relief. Associated nausea and generalized abd pain. Will plan on iv fluids, zofran for nausea and soap suds enema. Oumar Ruff PA-C    2:14 AM  KUB noted to show moderate stool burden. Will continue w/ treatment for constipation. All questions answered and patient in agreement with plan of care. Will plan for discharge. Oumar Ruff PA-C    Disposition:  Discharged    DISCHARGE NOTE:       Care plan outlined and precautions discussed. Patient has no new complaints, changes, or physical findings. Results of KUB were reviewed with the patient. All medications were reviewed with the patient; will d/c home with n/a.  All of pt's questions and concerns were addressed. Patient was instructed and agrees to follow up with PCP, as well as to return to the ED upon further deterioration. Patient is ready to go home. Follow-up Information     Follow up With Details Comments [address] [facility] EMERGENCY DEPT  If symptoms worsen [address] [facility]. Paul Jennings MD Call in 3 days As needed for ER follow up           Current Discharge Medication List      CONTINUE these medications which have NOT CHANGED    Details   ondansetron hcl (ZOFRAN) 4 mg tablet Take 1 Tab by mouth every six (6) hours as needed for Nausea for 10 doses. Qty: 10 Tab, Refills: 0      HYDROcodone-acetaminophen (NORCO) 5-325 mg per tablet Take 1 Tab by mouth every twelve (12) hours. Qty: 30 Tab, Refills: 0      multivitamin (ONE A DAY) tablet Take 1 Tab by mouth daily. ergocalciferol (VITAMIN D2) 50,000 unit capsule Take 50,000 Units by mouth. Provider Notes (Medical Decision Making):     Procedures:  Procedures        Diagnosis     Clinical Impression:   1. Constipation, unspecified constipation type    2. Nausea without vomiting    3.  Abdominal pain, generalized

## 2018-05-28 NOTE — DISCHARGE INSTRUCTIONS
Constipation: Care Instructions  Your Care Instructions    Constipation means that you have a hard time passing stools (bowel movements). People pass stools from 3 times a day to once every 3 days. What is normal for you may be different. Constipation may occur with pain in the rectum and cramping. The pain may get worse when you try to pass stools. Sometimes there are small amounts of bright red blood on toilet paper or the surface of stools. This is because of enlarged veins near the rectum (hemorrhoids). A few changes in your diet and lifestyle may help you avoid ongoing constipation. Your doctor may also prescribe medicine to help loosen your stool. Some medicines can cause constipation. These include pain medicines and antidepressants. Tell your doctor about all the medicines you take. Your doctor may want to make a medicine change to ease your symptoms. Follow-up care is a key part of your treatment and safety. Be sure to make and go to all appointments, and call your doctor if you are having problems. It's also a good idea to know your test results and keep a list of the medicines you take. How can you care for yourself at home? · Drink plenty of fluids, enough so that your urine is light yellow or clear like water. If you have kidney, heart, or liver disease and have to limit fluids, talk with your doctor before you increase the amount of fluids you drink. · Include high-fiber foods in your diet each day. These include fruits, vegetables, beans, and whole grains. · Get at least 30 minutes of exercise on most days of the week. Walking is a good choice. You also may want to do other activities, such as running, swimming, cycling, or playing tennis or team sports. · Take a fiber supplement, such as Citrucel or Metamucil, every day. Read and follow all instructions on the label. · Schedule time each day for a bowel movement. A daily routine may help.  Take your time having your bowel movement. · Support your feet with a small step stool when you sit on the toilet. This helps flex your hips and places your pelvis in a squatting position. · Your doctor may recommend an over-the-counter laxative to relieve your constipation. Examples are Milk of Magnesia and MiraLax. Read and follow all instructions on the label. Do not use laxatives on a long-term basis. When should you call for help? Call your doctor now or seek immediate medical care if:  ? · You have new or worse belly pain. ? · You have new or worse nausea or vomiting. ? · You have blood in your stools. ? Watch closely for changes in your health, and be sure to contact your doctor if:  ? · Your constipation is getting worse. ? · You do not get better as expected. Where can you learn more? Go to http://shauna-toshia.info/. Enter 21 [id number] in the search box to learn more about \"Constipation: Care Instructions. \"  Current as of: March 20, 2017  Content Version: 11.4  © 7338-9203 [employer]. Care instructions adapted under license by [employer] (which disclaims liability or warranty for this information). If you have questions about a medical condition or this instruction, always ask your healthcare professional. [address] any warranty or liability for your use of this information.

## 2018-12-11 ENCOUNTER — HOSPITAL ENCOUNTER (EMERGENCY)
Age: 68
Discharge: HOME | End: 2018-12-11
Attending: EMERGENCY MEDICINE
Payer: MEDICARE

## 2018-12-11 VITALS
TEMPERATURE: 98.2 F | DIASTOLIC BLOOD PRESSURE: 68 MMHG | RESPIRATION RATE: 18 BRPM | OXYGEN SATURATION: 97 % | HEART RATE: 78 BPM | SYSTOLIC BLOOD PRESSURE: 147 MMHG

## 2018-12-11 DIAGNOSIS — R53.81 MALAISE AND FATIGUE: Primary | ICD-10-CM

## 2018-12-11 DIAGNOSIS — R53.83 MALAISE AND FATIGUE: Primary | ICD-10-CM

## 2018-12-11 LAB
ALBUMIN SERPL-MCNC: 4.1 G/DL (ref 3.4–5)
ALBUMIN/GLOB SERPL: 1.2 {RATIO} (ref 0.8–1.7)
ALP SERPL-CCNC: 59 U/L (ref 45–117)
ALT SERPL-CCNC: 47 U/L (ref 13–56)
ANION GAP SERPL CALC-SCNC: 7 MMOL/L (ref 3–18)
AST SERPL-CCNC: 26 U/L (ref 15–37)
BASOPHILS # BLD: 0 K/UL (ref 0–0.1)
BASOPHILS NFR BLD: 0 % (ref 0–2)
BILIRUB SERPL-MCNC: 0.3 MG/DL (ref 0.2–1)
BUN SERPL-MCNC: 16 MG/DL (ref 7–18)
BUN/CREAT SERPL: 23 (ref 12–20)
CALCIUM SERPL-MCNC: 8.9 MG/DL (ref 8.5–10.1)
CHLORIDE SERPL-SCNC: 107 MMOL/L (ref 100–108)
CO2 SERPL-SCNC: 27 MMOL/L (ref 21–32)
CREAT SERPL-MCNC: 0.7 MG/DL (ref 0.6–1.3)
DIFFERENTIAL METHOD BLD: NORMAL
EOSINOPHIL # BLD: 0.3 K/UL (ref 0–0.4)
EOSINOPHIL NFR BLD: 4 % (ref 0–5)
ERYTHROCYTE [DISTWIDTH] IN BLOOD BY AUTOMATED COUNT: 14.3 % (ref 11.6–14.5)
GLOBULIN SER CALC-MCNC: 3.5 G/DL (ref 2–4)
GLUCOSE SERPL-MCNC: 103 MG/DL (ref 74–99)
HCT VFR BLD AUTO: 41.4 % (ref 35–45)
HGB BLD-MCNC: 13.7 G/DL (ref 12–16)
LYMPHOCYTES # BLD: 2.3 K/UL (ref 0.9–3.6)
LYMPHOCYTES NFR BLD: 28 % (ref 21–52)
MAGNESIUM SERPL-MCNC: 2.1 MG/DL (ref 1.6–2.6)
MCH RBC QN AUTO: 27.5 PG (ref 24–34)
MCHC RBC AUTO-ENTMCNC: 33.1 G/DL (ref 31–37)
MCV RBC AUTO: 83 FL (ref 74–97)
MONOCYTES # BLD: 0.4 K/UL (ref 0.05–1.2)
MONOCYTES NFR BLD: 5 % (ref 3–10)
NEUTS SEG # BLD: 5.1 K/UL (ref 1.8–8)
NEUTS SEG NFR BLD: 63 % (ref 40–73)
PLATELET # BLD AUTO: 248 K/UL (ref 135–420)
PMV BLD AUTO: 10.2 FL (ref 9.2–11.8)
POTASSIUM SERPL-SCNC: 3.9 MMOL/L (ref 3.5–5.5)
PROT SERPL-MCNC: 7.6 G/DL (ref 6.4–8.2)
RBC # BLD AUTO: 4.99 M/UL (ref 4.2–5.3)
SODIUM SERPL-SCNC: 141 MMOL/L (ref 136–145)
TROPONIN I SERPL-MCNC: <0.02 NG/ML (ref 0–0.04)
WBC # BLD AUTO: 8 K/UL (ref 4.6–13.2)

## 2018-12-11 PROCEDURE — 85025 COMPLETE CBC W/AUTO DIFF WBC: CPT

## 2018-12-11 PROCEDURE — 80053 COMPREHEN METABOLIC PANEL: CPT

## 2018-12-11 PROCEDURE — 99284 EMERGENCY DEPT VISIT MOD MDM: CPT

## 2018-12-11 PROCEDURE — 93005 ELECTROCARDIOGRAM TRACING: CPT

## 2018-12-11 PROCEDURE — 84484 ASSAY OF TROPONIN QUANT: CPT

## 2018-12-11 PROCEDURE — 83735 ASSAY OF MAGNESIUM: CPT

## 2018-12-11 RX ORDER — NITROFURANTOIN 25; 75 MG/1; MG/1
100 CAPSULE ORAL 2 TIMES DAILY
Qty: 10 CAP | Refills: 0 | Status: SHIPPED | OUTPATIENT
Start: 2018-12-11 | End: 2018-12-16

## 2018-12-11 RX ORDER — FLUOXETINE 10 MG/1
10 TABLET ORAL DAILY
COMMUNITY

## 2018-12-11 RX ORDER — METFORMIN HYDROCHLORIDE 500 MG/1
TABLET ORAL 2 TIMES DAILY WITH MEALS
COMMUNITY

## 2018-12-11 RX ORDER — CEPHALEXIN 250 MG/1
250 CAPSULE ORAL 4 TIMES DAILY
COMMUNITY
End: 2018-12-11

## 2018-12-11 RX ORDER — NITROFURANTOIN 25; 75 MG/1; MG/1
100 CAPSULE ORAL 2 TIMES DAILY
Qty: 10 CAP | Refills: 0 | Status: SHIPPED | OUTPATIENT
Start: 2018-12-11 | End: 2018-12-11 | Stop reason: SDUPTHER

## 2018-12-11 NOTE — DISCHARGE INSTRUCTIONS
Fatigue: Care Instructions  Your Care Instructions    Fatigue is a feeling of tiredness, exhaustion, or lack of energy. You may feel fatigue because of too much or not enough activity. It can also come from stress, lack of sleep, boredom, and poor diet. Many medical problems, such as viral infections, can cause fatigue. Emotional problems, especially depression, are often the cause of fatigue. Fatigue is most often a symptom of another problem. Treatment for fatigue depends on the cause. For example, if you have fatigue because you have a certain health problem, treating this problem also treats your fatigue. If depression or anxiety is the cause, treatment may help. Follow-up care is a key part of your treatment and safety. Be sure to make and go to all appointments, and call your doctor if you are having problems. It's also a good idea to know your test results and keep a list of the medicines you take. How can you care for yourself at home? · Get regular exercise. But don't overdo it. Go back and forth between rest and exercise. · Get plenty of rest.  · Eat a healthy diet. Do not skip meals, especially breakfast.  · Reduce your use of caffeine, tobacco, and alcohol. Caffeine is most often found in coffee, tea, cola drinks, and chocolate. · Limit medicines that can cause fatigue. This includes tranquilizers and cold and allergy medicines. When should you call for help? Watch closely for changes in your health, and be sure to contact your doctor if:    · You have new symptoms such as fever or a rash.     · Your fatigue gets worse.     · You have been feeling down, depressed, or hopeless. Or you may have lost interest in things that you usually enjoy.     · You are not getting better as expected. Where can you learn more? Go to http://shauna-toshia.info/. Enter A259 in the search box to learn more about \"Fatigue: Care Instructions. \"  Current as of: November 20, 2017  Content Version: 11.8  © 8585-3234 [employer]. Care instructions adapted under license by [employer] (which disclaims liability or warranty for this information). If you have questions about a medical condition or this instruction, always ask your healthcare professional. [address] any warranty or liability for your use of this information.

## 2018-12-11 NOTE — ED TRIAGE NOTES
Per EMS-Patient started taking an antibiotic for a UTI yesterday and states she is feeling weak from it now. \"

## 2018-12-11 NOTE — ED PROVIDER NOTES
Emergency Department Treatment Report Patient: Lilly Tamayo Age: 80 y.o. Sex: female YOB: 1950 Admit Date: 12/11/2018 PCP: Angel Bear DO  
MRN: [id number]  CSN: [id number] Room: [place] [address] Time Dictated: 9:59 AM   
 
Chief Complaint Chief Complaint Patient presents with  Fatigue History of Present Illness 80 y.o. female, PMH breast CA, UTI presents with acute lightheadedness starting today hours PTA. Pt states she feels like she is going to pass out and it started after she took Keflex. Pt had breakfast, has been drinking many fluids. Associated sx include mild SOB, generalized weakness. No hx prior sx. Pt started taking Keflex yesterday for a UTI diagnosed about 1 wk ago. Pt is not on treatment for breast CA (in remission for 12 years). She reports her BG is well controlled. Review of Systems Constitutional: No fever, chills, or weight loss. +generalized weakness Eyes: No visual symptoms. ENT: No sore throat, runny nose or ear pain. Respiratory: No cough or wheezing.+ mild SOB. Cardiovascular: No chest pain, pressure, palpitations, tightness or heaviness. Gastrointestinal: No vomiting, diarrhea or abdominal pain. Genitourinary: No dysuria, urgency. + frequency. Musculoskeletal: No joint pain or swelling. Integumentary: No rashes. Neurological: No headaches, sensory or motor symptoms. + lightheadedness. Denies complaints in all other systems. Past Medical/Surgical History Past Medical History:  
Diagnosis Date  [facility] [address]  Diabetes ([facility] [address]  Kidney stone  Urinary tract infection, site not specified Past Surgical History:  
Procedure Laterality Date  HX HYSTERECTOMY  HX MASTECTOMY    
 double mastectomy  INSERT MESH/PELVIC FLR ADDON Social History Social History Socioeconomic History  Marital status:  Spouse name: Not on file  Number of children: Not on file  Years of education: Not on file  Highest education level: Not on file Tobacco Use  Smoking status: Never Smoker  Smokeless tobacco: Never Used Substance and Sexual Activity  Alcohol use: Yes Comment: occassionally  Drug use: No  
 
 
Family History Family History Problem Relation Age of Onset  Cancer Mother   
     breast  
 
 
Home Medications Prior to Admission Medications Prescriptions Last Dose Informant Patient Reported? Taking? HYDROcodone-acetaminophen (NORCO) 5-325 mg per tablet   No No  
Sig: Take 1 Tab by mouth every twelve (12) hours. ergocalciferol (VITAMIN D2) 50,000 unit capsule   Yes No  
Sig: Take 50,000 Units by mouth.  
multivitamin (ONE A DAY) tablet   Yes No  
Sig: Take 1 Tab by mouth daily. ondansetron hcl (ZOFRAN) 4 mg tablet   No No  
Sig: Take 1 Tab by mouth every six (6) hours as needed for Nausea for 10 doses. promethazine (PHENERGAN) 25 mg tablet   No No  
Sig: Take 1 Tab by mouth every six (6) hours as needed for Nausea. Caution: This medication may make you drowsy. Avoid driving while under the influence of this medication. Facility-Administered Medications: None Allergies Allergies Allergen Reactions  Ciprofloxacin Rash  Codeine Nausea and Vomiting  Pcn [Penicillins] Rash and Itching  Sulfa (Sulfonamide Antibiotics) Rash Physical Exam  
 
ED Triage Vitals ED Encounter Vitals Group BP Pulse Resp Temp Temp src SpO2 Weight Height Constitutional: Patient appears well developed and well nourished. Appearance and behavior are age and situation appropriate. HEENT: Conjunctiva clear. PERRLA. Mucous membranes moist, non-erythematous. Surface of the pharynx, palate, and tongue are pink, moist and without lesions. Neck: supple, non tender, symmetrical, no masses or JVD. Respiratory: lungs clear to auscultation, nonlabored respirations.  No tachypnea or accessory muscle use. Cardiovascular: heart regular rate and rhythm without murmur rubs or gallops. Calves soft and non-tender. Distal pulses 2+ and equal bilaterally. No peripheral edema. Gastrointestinal:  Abdomen soft, nontender without complaint of pain to palpation Musculoskeletal: Nail beds pink with prompt capillary refill Integumentary: warm and dry without rashes or lesions Neurologic: alert and oriented, Sensation intact, motor strength equal and symmetric. No facial asymmetry or dysarthria. Diagnostic Studies Lab:  
Recent Results (from the past 12 hour(s)) CBC WITH AUTOMATED DIFF Collection Time: 12/11/18 10:50 AM  
Result Value Ref Range WBC 8.0 4.6 - 13.2 K/uL  
 RBC 4.99 4.20 - 5.30 M/uL  
 HGB 13.7 12.0 - 16.0 g/dL HCT 41.4 35.0 - 45.0 % MCV 83.0 74.0 - 97.0 FL  
 MCH 27.5 24.0 - 34.0 PG  
 MCHC 33.1 31.0 - 37.0 g/dL  
 RDW 14.3 11.6 - 14.5 % PLATELET 818 981 - 778 K/uL MPV 10.2 9.2 - 11.8 FL  
 NEUTROPHILS 63 40 - 73 % LYMPHOCYTES 28 21 - 52 % MONOCYTES 5 3 - 10 % EOSINOPHILS 4 0 - 5 % BASOPHILS 0 0 - 2 %  
 ABS. NEUTROPHILS 5.1 1.8 - 8.0 K/UL  
 ABS. LYMPHOCYTES 2.3 0.9 - 3.6 K/UL  
 ABS. MONOCYTES 0.4 0.05 - 1.2 K/UL  
 ABS. EOSINOPHILS 0.3 0.0 - 0.4 K/UL  
 ABS. BASOPHILS 0.0 0.0 - 0.1 K/UL  
 DF AUTOMATED METABOLIC PANEL, COMPREHENSIVE Collection Time: 12/11/18 10:50 AM  
Result Value Ref Range Sodium 141 136 - 145 mmol/L Potassium 3.9 3.5 - 5.5 mmol/L Chloride 107 100 - 108 mmol/L  
 CO2 27 21 - 32 mmol/L Anion gap 7 3.0 - 18 mmol/L Glucose 103 (H) 74 - 99 mg/dL BUN 16 7.0 - 18 MG/DL Creatinine 0.70 0.6 - 1.3 MG/DL  
 BUN/Creatinine ratio 23 (H) 12 - 20 GFR est AA >60 >60 ml/min/1.73m2 GFR est non-AA >60 >60 ml/min/1.73m2 Calcium 8.9 8.5 - 10.1 MG/DL Bilirubin, total 0.3 0.2 - 1.0 MG/DL  
 ALT (SGPT) 47 13 - 56 U/L  
 AST (SGOT) 26 15 - 37 U/L Alk.  phosphatase 59 45 - 117 U/L  
 Protein, total 7.6 6.4 - 8.2 g/dL Albumin 4.1 3.4 - 5.0 g/dL Globulin 3.5 2.0 - 4.0 g/dL A-G Ratio 1.2 0.8 - 1.7 MAGNESIUM Collection Time: 12/11/18 10:50 AM  
Result Value Ref Range Magnesium 2.1 1.6 - 2.6 mg/dL TROPONIN I Collection Time: 12/11/18 10:50 AM  
Result Value Ref Range Troponin-I, Qt. <0.02 0.0 - 0.045 NG/ML  
EKG, 12 LEAD, INITIAL Collection Time: 12/11/18 10:55 AM  
Result Value Ref Range Ventricular Rate 62 BPM  
 Atrial Rate 62 BPM  
 P-R Interval 170 ms QRS Duration 82 ms Q-T Interval 418 ms QTC Calculation (Bezet) 424 ms Calculated P Axis 67 degrees Calculated R Axis 20 degrees Calculated T Axis 34 degrees Diagnosis Normal sinus rhythm Normal ECG When compared with ECG of 22-FEB-2017 23:26, No significant change was found Imaging: No results found. [unfilled] EKG Interpretation: 
 
10:55 NSR, HR 62, QTc 424, No ST or T wave changes ED Course/Medical Decision Making Patient appears well. Her symptoms completely resolved. Blood work is unremarkable. No arrhythmias and EKG is normal. No signs of PE. Using [place] Syncope Rules, she is safe for outpatient work-up of her near syncope. Patient will be discharged home with f/u at her PMD. Given very strict return precautions and she understands. Medications - No data to display Final Diagnosis ICD-10-CM ICD-9-CM 1. Malaise and fatigue R53.81 780.79 R53.83 Disposition Patient is discharged home in stable condition. Advised to follow with their primary care physician. Patient advised to return to the ER for any new or worsening symptoms. My Medications START taking these medications Instructions Each Dose to Equal Morning Noon Evening Bedtime  
nitrofurantoin (macrocrystal-monohydrate) 100 mg capsule Commonly known as:  MACROBID Your last dose was: Your next dose is: Take 1 Cap by mouth two (2) times a day for 5 days. 100 mg CONTINUE taking these medications Instructions Each Dose to Equal Morning Noon Evening Bedtime FLUoxetine 10 mg tablet Commonly known as:  PROzac Your last dose was: Your next dose is: Take 10 mg by mouth daily. 10 mg 
  
  
  
  
  
metFORMIN 500 mg tablet Commonly known as:  GLUCOPHAGE Your last dose was: Your next dose is: Take  by mouth two (2) times daily (with meals). multivitamin tablet Commonly known as:  ONE A DAY Your last dose was: Your next dose is: Take 1 Tab by mouth daily. 1 Tab VITAMIN D2 50,000 unit capsule Generic drug:  ergocalciferol Your last dose was: Your next dose is: Take 50,000 Units by mouth. 07515 Units STOP taking these medications   
cephALEXin 250 mg capsule Commonly known as:  Mykel Sellers Where to Get Your Medications These medications were sent to [address], [address]  [address], [place] [address] [address] Phone:  [phone]  
· nitrofurantoin (macrocrystal-monohydrate) 100 mg capsule Juvencio Chan MD 
December 11, 2018 My signature above authenticates this document and my orders, the final   
diagnosis (es), discharge prescription (s), and instructions in the Epic   
record. If you have any questions please contact [phone]. 
  
Nursing notes have been reviewed by the physician/ advanced practice   
Clinician. Scribe Attestation Marylin Diaz acting as a scribe for and in the presence of Elizabeth Natarajan MD     
December 11, 2018 at 10:13 AM 
    
Provider Attestation:     
I personally performed the services described in the documentation, reviewed the documentation, as recorded by the scribe in my presence, and it accurately and completely records my words and actions.  December 11, 2018 at 10:13 AM - Christian Huff MD

## 2018-12-11 NOTE — ED NOTES
Discharge medications reviewed with patient and appropriate educational materials and side effects teaching were provided. I have reviewed discharge instructions with the patient. The patient verbalized understanding. Pt informed of the change in medication.

## 2018-12-12 LAB
ATRIAL RATE: 62 BPM
CALCULATED P AXIS, ECG09: 67 DEGREES
CALCULATED R AXIS, ECG10: 20 DEGREES
CALCULATED T AXIS, ECG11: 34 DEGREES
DIAGNOSIS, 93000: NORMAL
P-R INTERVAL, ECG05: 170 MS
Q-T INTERVAL, ECG07: 418 MS
QRS DURATION, ECG06: 82 MS
QTC CALCULATION (BEZET), ECG08: 424 MS
VENTRICULAR RATE, ECG03: 62 BPM

## 2018-12-17 ENCOUNTER — HOSPITAL ENCOUNTER (EMERGENCY)
Age: 68
Discharge: HOME | End: 2018-12-18
Attending: EMERGENCY MEDICINE
Payer: MEDICARE

## 2018-12-17 ENCOUNTER — APPOINTMENT (OUTPATIENT)
Dept: GENERAL RADIOLOGY | Age: 68
End: 2018-12-17
Attending: NURSE PRACTITIONER
Payer: MEDICARE

## 2018-12-17 DIAGNOSIS — F41.9 ANXIETY: Primary | ICD-10-CM

## 2018-12-17 LAB
ANION GAP SERPL CALC-SCNC: 8 MMOL/L (ref 3–18)
BASOPHILS # BLD: 0 K/UL (ref 0–0.1)
BASOPHILS NFR BLD: 0 % (ref 0–2)
BUN SERPL-MCNC: 20 MG/DL (ref 7–18)
BUN/CREAT SERPL: 27 (ref 12–20)
CALCIUM SERPL-MCNC: 9 MG/DL (ref 8.5–10.1)
CHLORIDE SERPL-SCNC: 104 MMOL/L (ref 100–108)
CK MB CFR SERPL CALC: 1.3 % (ref 0–4)
CK MB SERPL-MCNC: 1 NG/ML (ref 5–25)
CK SERPL-CCNC: 77 U/L (ref 26–192)
CO2 SERPL-SCNC: 26 MMOL/L (ref 21–32)
CREAT SERPL-MCNC: 0.74 MG/DL (ref 0.6–1.3)
DIFFERENTIAL METHOD BLD: NORMAL
EOSINOPHIL # BLD: 0.3 K/UL (ref 0–0.4)
EOSINOPHIL NFR BLD: 4 % (ref 0–5)
ERYTHROCYTE [DISTWIDTH] IN BLOOD BY AUTOMATED COUNT: 14 % (ref 11.6–14.5)
GLUCOSE SERPL-MCNC: 142 MG/DL (ref 74–99)
HCT VFR BLD AUTO: 40.3 % (ref 35–45)
HGB BLD-MCNC: 13 G/DL (ref 12–16)
LYMPHOCYTES # BLD: 3.5 K/UL (ref 0.9–3.6)
LYMPHOCYTES NFR BLD: 39 % (ref 21–52)
MCH RBC QN AUTO: 26.7 PG (ref 24–34)
MCHC RBC AUTO-ENTMCNC: 32.3 G/DL (ref 31–37)
MCV RBC AUTO: 82.9 FL (ref 74–97)
MONOCYTES # BLD: 0.6 K/UL (ref 0.05–1.2)
MONOCYTES NFR BLD: 7 % (ref 3–10)
NEUTS SEG # BLD: 4.5 K/UL (ref 1.8–8)
NEUTS SEG NFR BLD: 50 % (ref 40–73)
PLATELET # BLD AUTO: 246 K/UL (ref 135–420)
PMV BLD AUTO: 10.1 FL (ref 9.2–11.8)
POTASSIUM SERPL-SCNC: 3.5 MMOL/L (ref 3.5–5.5)
RBC # BLD AUTO: 4.86 M/UL (ref 4.2–5.3)
SODIUM SERPL-SCNC: 138 MMOL/L (ref 136–145)
TROPONIN I SERPL-MCNC: <0.02 NG/ML (ref 0–0.04)
WBC # BLD AUTO: 8.9 K/UL (ref 4.6–13.2)

## 2018-12-17 PROCEDURE — 99285 EMERGENCY DEPT VISIT HI MDM: CPT

## 2018-12-17 PROCEDURE — 93005 ELECTROCARDIOGRAM TRACING: CPT

## 2018-12-17 PROCEDURE — 82553 CREATINE MB FRACTION: CPT

## 2018-12-17 PROCEDURE — 85025 COMPLETE CBC W/AUTO DIFF WBC: CPT

## 2018-12-17 PROCEDURE — 80048 BASIC METABOLIC PNL TOTAL CA: CPT

## 2018-12-17 PROCEDURE — 71045 X-RAY EXAM CHEST 1 VIEW: CPT

## 2018-12-18 VITALS
BODY MASS INDEX: 36.96 KG/M2 | WEIGHT: 230 LBS | RESPIRATION RATE: 17 BRPM | SYSTOLIC BLOOD PRESSURE: 135 MMHG | HEART RATE: 71 BPM | OXYGEN SATURATION: 97 % | HEIGHT: 66 IN | DIASTOLIC BLOOD PRESSURE: 80 MMHG

## 2018-12-18 LAB
ATRIAL RATE: 73 BPM
CALCULATED P AXIS, ECG09: 75 DEGREES
CALCULATED R AXIS, ECG10: 41 DEGREES
CALCULATED T AXIS, ECG11: 32 DEGREES
DIAGNOSIS, 93000: NORMAL
P-R INTERVAL, ECG05: 158 MS
Q-T INTERVAL, ECG07: 422 MS
QRS DURATION, ECG06: 88 MS
QTC CALCULATION (BEZET), ECG08: 464 MS
VENTRICULAR RATE, ECG03: 73 BPM

## 2018-12-18 NOTE — DISCHARGE INSTRUCTIONS
BUX Activation    Thank you for requesting access to BUX. Please follow the instructions below to securely access and download your online medical record. BUX allows you to send messages to your doctor, view your test results, renew your prescriptions, schedule appointments, and more. How Do I Sign Up? 1. In your internet browser, go to [email or web address]  2. Click on the First Time User? Click Here link in the Sign In box. You will be redirect to the New Member Sign Up page. 3. Enter your BUX Access Code exactly as it appears below. You will not need to use this code after youve completed the sign-up process. If you do not sign up before the expiration date, you must request a new code. BUX Access Code: [email or web address]  Expires: 3/11/2019  9:56 AM (This is the date your BUX access code will )    4. Enter the last four digits of your Social Security Number (xxxx) and Date of Birth (mm/dd/yyyy) as indicated and click Submit. You will be taken to the next sign-up page. 5. Create a BUX ID. This will be your BUX login ID and cannot be changed, so think of one that is secure and easy to remember. 6. Create a BUX password. You can change your password at any time. 7. Enter your Password Reset Question and Answer. This can be used at a later time if you forget your password. 8. Enter your e-mail address. You will receive e-mail notification when new information is available in [address] 9. Click Sign Up. You can now view and download portions of your medical record. 10. Click the Download Summary menu link to download a portable copy of your medical information. Additional Information    If you have questions, please visit the Frequently Asked Questions section of the BUX website at [email or web address] Remember, BUX is NOT to be used for urgent needs. For medical emergencies, dial 911. Take all medication as directed. Follow up with your primary care provider.

## 2018-12-18 NOTE — ED PROVIDER NOTES
Giacomo Alonso is a 80year old female who presents to the ED after being brought in by [facility]. Pt states she was at home laying on her bed talking on the telephone, when she felt overcome by a feeling of dizziness. States she has a headache and left shoulder pain as well. Adds that this happened to her last week as well. She came to the ER and was evaluated and subsequently discharged to home. States this feels exactly the same way. Admits to a Hx of anxiety, and wonders if this could be anxiety related. Past Medical History:   Diagnosis Date    [facility] ([facility] [address]     Diabetes ([facility] [address]     Kidney stone     Urinary tract infection, site not specified        Past Surgical History:   Procedure Laterality Date    HX HYSTERECTOMY      HX MASTECTOMY      double mastectomy    INSERT MESH/PELVIC FLR ADDON           Family History:   Problem Relation Age of Onset    Cancer Mother         breast       Social History     Socioeconomic History    Marital status:      Spouse name: Not on file    Number of children: Not on file    Years of education: Not on file    Highest education level: Not on file   Social Needs    Financial resource strain: Not on file    Food insecurity - worry: Not on file    Food insecurity - inability: Not on file   [employer] needs - medical: Not on file   [employer] needs - non-medical: Not on file   Occupational History    Not on file   Tobacco Use    Smoking status: Never Smoker    Smokeless tobacco: Never Used   Substance and Sexual Activity    Alcohol use: Yes     Comment: occassionally    Drug use: No    Sexual activity: Not on file   Other Topics Concern    Not on file   Social History Narrative    Not on file         ALLERGIES: Ciprofloxacin; Codeine; Pcn [penicillins]; and Sulfa (sulfonamide antibiotics)    Review of Systems   Constitutional: Negative. HENT: Negative. Eyes: Negative.     Respiratory: Positive for shortness of breath. Cardiovascular: Negative. Gastrointestinal: Negative. Endocrine: Negative. Genitourinary: Negative. Musculoskeletal: Positive for arthralgias (left shoulder paion). Skin: Negative. Allergic/Immunologic: Negative. Neurological: Positive for headaches. Psychiatric/Behavioral: Negative. Vitals:    12/17/18 2129   BP: 180/78   Pulse: 74   Resp: 17   SpO2: 100%   Weight: 104.3 kg (230 lb)   Height: 5' 6\" (1.676 m)            Physical Exam   Constitutional: She appears well-developed and well-nourished. No distress. HENT:   Head: Normocephalic and atraumatic. Nose: Nose normal.   Eyes: EOM are normal. Right eye exhibits no discharge. Left eye exhibits no discharge. No scleral icterus. Neck: Normal range of motion. Neck supple. No tracheal deviation present. Cardiovascular: Normal rate, regular rhythm and intact distal pulses. Non TTP on the anterior chest wal.     Pulmonary/Chest: Effort normal and breath sounds normal. She exhibits no tenderness, no bony tenderness, no edema and no deformity. No adventitious lung sounds. Abdominal: Soft. She exhibits no distension. Genitourinary:   Genitourinary Comments: NE     Musculoskeletal: Normal range of motion. She exhibits no deformity. Neurological: She is alert. Coordination normal.   Skin: Skin is warm and dry. NE.   Psychiatric: She has a normal mood and affect. Her behavior is normal.   Nursing note and vitals reviewed. MDM  Number of Diagnoses or Management Options  Anxiety:   Diagnosis management comments: PROGRESS NOTE:  The CXR plain film was reviewed. No acute findings. Noam Gupta NP  10:59 PM    PROGRESS NOTE:  Rounded on the Pt, who stats she feels better. She admits that she is not complaint with the correct dose of the Prozac she is on. She is only taking half the tab instead of the whole tab she is supposed to be taking. Aside from that, the Pt states she is ready to go home. Josafat Fox NP  12:29 AM           Amount and/or Complexity of Data Reviewed  Clinical lab tests: ordered and reviewed  Tests in the radiology section of CPT®: ordered and reviewed  Independent visualization of images, tracings, or specimens: yes (Plain film XR  )    Risk of Complications, Morbidity, and/or Mortality  Presenting problems: low  Diagnostic procedures: low  Management options: low           Procedures      Diagnosis:   1. Anxiety          Disposition:   Discharged to Home. Follow-up Information     Follow up With Specialties Details Why Contact Info    Venus Palafox,   Call in the morning to arrange follow up    [address] [place] [address]  [phone]               Medication List      ASK your doctor about these medications    FLUoxetine 10 mg tablet  Commonly known as:  PROzac     metFORMIN 500 mg tablet  Commonly known as:  GLUCOPHAGE     multivitamin tablet  Commonly known as:  ONE A DAY     nitrofurantoin (macrocrystal-monohydrate) 100 mg capsule  Commonly known as:  MACROBID  Take 1 Cap by mouth two (2) times a day for 5 days. Ask about: Should I take this medication?      VITAMIN D2 50,000 unit capsule  Generic drug:  ergocalciferol

## 2018-12-18 NOTE — ED NOTES
Bedside shift change report given to 7 Vee Bermeo (oncoming nurse) by Kathi Angeles RN (offgoing nurse). Report included the following information SBAR.

## 2018-12-18 NOTE — ED NOTES
1:15 AM  12/18/18     Discharge instructions given to patient (name) with verbalization of understanding. Patient accompanied by daughter. Patient discharged with the following prescriptions none. Patient discharged to home (destination).       Addi Giang RN
